# Patient Record
Sex: MALE | Race: WHITE | Employment: UNEMPLOYED | URBAN - NONMETROPOLITAN AREA
[De-identification: names, ages, dates, MRNs, and addresses within clinical notes are randomized per-mention and may not be internally consistent; named-entity substitution may affect disease eponyms.]

---

## 2021-02-19 ENCOUNTER — LAB ENCOUNTER (OUTPATIENT)
Dept: LAB | Age: 51
End: 2021-02-19
Attending: FAMILY MEDICINE
Payer: COMMERCIAL

## 2021-02-19 ENCOUNTER — OFFICE VISIT (OUTPATIENT)
Dept: FAMILY MEDICINE CLINIC | Facility: CLINIC | Age: 51
End: 2021-02-19
Payer: COMMERCIAL

## 2021-02-19 VITALS
TEMPERATURE: 98 F | WEIGHT: 193 LBS | DIASTOLIC BLOOD PRESSURE: 78 MMHG | BODY MASS INDEX: 27.63 KG/M2 | OXYGEN SATURATION: 98 % | HEART RATE: 77 BPM | HEIGHT: 70 IN | RESPIRATION RATE: 14 BRPM | SYSTOLIC BLOOD PRESSURE: 108 MMHG

## 2021-02-19 DIAGNOSIS — Z13.1 SCREENING FOR DIABETES MELLITUS: ICD-10-CM

## 2021-02-19 DIAGNOSIS — Z00.00 LABORATORY EXAM ORDERED AS PART OF ROUTINE GENERAL MEDICAL EXAMINATION: ICD-10-CM

## 2021-02-19 DIAGNOSIS — R35.0 URINARY FREQUENCY: ICD-10-CM

## 2021-02-19 DIAGNOSIS — Z00.00 PREVENTATIVE HEALTH CARE: Primary | ICD-10-CM

## 2021-02-19 DIAGNOSIS — Z13.220 SCREENING, LIPID: ICD-10-CM

## 2021-02-19 DIAGNOSIS — Z12.5 SCREENING FOR PROSTATE CANCER: ICD-10-CM

## 2021-02-19 DIAGNOSIS — Z23 NEED FOR VACCINATION: ICD-10-CM

## 2021-02-19 DIAGNOSIS — K42.9 UMBILICAL HERNIA WITHOUT OBSTRUCTION AND WITHOUT GANGRENE: ICD-10-CM

## 2021-02-19 DIAGNOSIS — Z12.11 SCREEN FOR COLON CANCER: ICD-10-CM

## 2021-02-19 LAB
ALBUMIN SERPL-MCNC: 3.9 G/DL (ref 3.4–5)
ALBUMIN/GLOB SERPL: 1.1 {RATIO} (ref 1–2)
ALP LIVER SERPL-CCNC: 94 U/L
ALT SERPL-CCNC: 53 U/L
ANION GAP SERPL CALC-SCNC: 6 MMOL/L (ref 0–18)
APPEARANCE: CLEAR
AST SERPL-CCNC: 12 U/L (ref 15–37)
BILIRUB SERPL-MCNC: 0.7 MG/DL (ref 0.1–2)
BILIRUBIN: NEGATIVE
BUN BLD-MCNC: 20 MG/DL (ref 7–18)
BUN/CREAT SERPL: 19 (ref 10–20)
CALCIUM BLD-MCNC: 8.5 MG/DL (ref 8.5–10.1)
CHLORIDE SERPL-SCNC: 104 MMOL/L (ref 98–112)
CHOLEST SMN-MCNC: 228 MG/DL (ref ?–200)
CO2 SERPL-SCNC: 25 MMOL/L (ref 21–32)
COMPLEXED PSA SERPL-MCNC: 1 NG/ML (ref ?–4)
CREAT BLD-MCNC: 1.05 MG/DL
EST. AVERAGE GLUCOSE BLD GHB EST-MCNC: 289 MG/DL (ref 68–126)
GLOBULIN PLAS-MCNC: 3.6 G/DL (ref 2.8–4.4)
GLUCOSE (URINE DIPSTICK): 500 MG/DL
GLUCOSE BLD-MCNC: 261 MG/DL (ref 70–99)
HBA1C MFR BLD HPLC: 11.7 % (ref ?–5.7)
HDLC SERPL-MCNC: 40 MG/DL (ref 40–59)
KETONES (URINE DIPSTICK): NEGATIVE MG/DL
LDLC SERPL CALC-MCNC: 158 MG/DL (ref ?–100)
LEUKOCYTES: NEGATIVE
M PROTEIN MFR SERPL ELPH: 7.5 G/DL (ref 6.4–8.2)
MULTISTIX LOT#: 5077 NUMERIC
NITRITE, URINE: NEGATIVE
NONHDLC SERPL-MCNC: 188 MG/DL (ref ?–130)
OCCULT BLOOD: NEGATIVE
OSMOLALITY SERPL CALC.SUM OF ELEC: 292 MOSM/KG (ref 275–295)
PH, URINE: 6 (ref 4.5–8)
POTASSIUM SERPL-SCNC: 4.3 MMOL/L (ref 3.5–5.1)
PROTEIN (URINE DIPSTICK): NEGATIVE MG/DL
SODIUM SERPL-SCNC: 135 MMOL/L (ref 136–145)
SPECIFIC GRAVITY: 1.02 (ref 1–1.03)
TRIGL SERPL-MCNC: 152 MG/DL (ref 30–149)
URINE-COLOR: YELLOW
UROBILINOGEN,SEMI-QN: 0.2 MG/DL (ref 0–1.9)
VLDLC SERPL CALC-MCNC: 30 MG/DL (ref 0–30)

## 2021-02-19 PROCEDURE — 3078F DIAST BP <80 MM HG: CPT | Performed by: FAMILY MEDICINE

## 2021-02-19 PROCEDURE — 3074F SYST BP LT 130 MM HG: CPT | Performed by: FAMILY MEDICINE

## 2021-02-19 PROCEDURE — 90471 IMMUNIZATION ADMIN: CPT | Performed by: FAMILY MEDICINE

## 2021-02-19 PROCEDURE — 84153 ASSAY OF PSA TOTAL: CPT | Performed by: FAMILY MEDICINE

## 2021-02-19 PROCEDURE — 90715 TDAP VACCINE 7 YRS/> IM: CPT | Performed by: FAMILY MEDICINE

## 2021-02-19 PROCEDURE — 81003 URINALYSIS AUTO W/O SCOPE: CPT | Performed by: FAMILY MEDICINE

## 2021-02-19 PROCEDURE — 80053 COMPREHEN METABOLIC PANEL: CPT | Performed by: FAMILY MEDICINE

## 2021-02-19 PROCEDURE — 80061 LIPID PANEL: CPT | Performed by: FAMILY MEDICINE

## 2021-02-19 PROCEDURE — 36415 COLL VENOUS BLD VENIPUNCTURE: CPT | Performed by: FAMILY MEDICINE

## 2021-02-19 PROCEDURE — 99386 PREV VISIT NEW AGE 40-64: CPT | Performed by: FAMILY MEDICINE

## 2021-02-19 PROCEDURE — 3008F BODY MASS INDEX DOCD: CPT | Performed by: FAMILY MEDICINE

## 2021-02-19 PROCEDURE — 83036 HEMOGLOBIN GLYCOSYLATED A1C: CPT | Performed by: FAMILY MEDICINE

## 2021-02-19 NOTE — PATIENT INSTRUCTIONS
I reviewed age-appropriate preventative health screening exams as well as immunizations. Patient declines flu vaccine.   Patient information regarding Shingrix provided, patient will check with his insurance for coverage  Tdap booster given  We will check

## 2021-02-19 NOTE — H&P
Delphine French. is a 45year old male who presents for a complete physical exam.  Patient is here to establish care  HPI:   Pt voices concerns ; establish care    Wt Readings from Last 6 Encounters:  02/19/21 : 193 lb (87.5 kg)    Body mass index is 27. yes hrs, voids normal during the day, lots of water, denies decreased urine stream, incontinence, erectile dysfunction, decreased libido   MUSCULOSKELETAL: denies back pain  NEURO: denies headaches, tremors, dizziness, numbness and weakness  PSYCHE: denies dep Encounter      Lipid Panel [E]      Comp Metabolic Panel (14) [E]      PSA (Screening) [E]      Hemoglobin A1C [E]      Urine Dip, auto without Micro      TdaP (Boostrix/Adacel) Vaccine (> 7 Y)      *Venipuncture    Meds & Refills for this Visit:  Requeste

## 2021-02-22 ENCOUNTER — OFFICE VISIT (OUTPATIENT)
Dept: FAMILY MEDICINE CLINIC | Facility: CLINIC | Age: 51
End: 2021-02-22
Payer: COMMERCIAL

## 2021-02-22 ENCOUNTER — TELEPHONE (OUTPATIENT)
Dept: FAMILY MEDICINE CLINIC | Facility: CLINIC | Age: 51
End: 2021-02-22

## 2021-02-22 VITALS
DIASTOLIC BLOOD PRESSURE: 74 MMHG | TEMPERATURE: 97 F | SYSTOLIC BLOOD PRESSURE: 120 MMHG | WEIGHT: 193 LBS | BODY MASS INDEX: 28 KG/M2 | OXYGEN SATURATION: 97 % | HEART RATE: 88 BPM

## 2021-02-22 DIAGNOSIS — R35.0 URINARY FREQUENCY: ICD-10-CM

## 2021-02-22 DIAGNOSIS — Z51.81 MEDICATION MONITORING ENCOUNTER: ICD-10-CM

## 2021-02-22 DIAGNOSIS — E11.9 NEW ONSET TYPE 2 DIABETES MELLITUS (HCC): Primary | ICD-10-CM

## 2021-02-22 LAB
CREAT UR-SCNC: 64.7 MG/DL
MICROALBUMIN UR-MCNC: 2.36 MG/DL
MICROALBUMIN/CREAT 24H UR-RTO: 36.5 UG/MG (ref ?–30)

## 2021-02-22 PROCEDURE — 99214 OFFICE O/P EST MOD 30 MIN: CPT | Performed by: FAMILY MEDICINE

## 2021-02-22 PROCEDURE — 82043 UR ALBUMIN QUANTITATIVE: CPT | Performed by: FAMILY MEDICINE

## 2021-02-22 PROCEDURE — 3078F DIAST BP <80 MM HG: CPT | Performed by: FAMILY MEDICINE

## 2021-02-22 PROCEDURE — 3074F SYST BP LT 130 MM HG: CPT | Performed by: FAMILY MEDICINE

## 2021-02-22 PROCEDURE — 82570 ASSAY OF URINE CREATININE: CPT | Performed by: FAMILY MEDICINE

## 2021-02-22 PROCEDURE — 3060F POS MICROALBUMINURIA REV: CPT | Performed by: FAMILY MEDICINE

## 2021-02-22 PROCEDURE — 3061F NEG MICROALBUMINURIA REV: CPT | Performed by: FAMILY MEDICINE

## 2021-02-22 RX ORDER — GLUCOSAMINE HCL/CHONDROITIN SU 500-400 MG
CAPSULE ORAL
Qty: 100 STRIP | Refills: 3 | Status: SHIPPED | OUTPATIENT
Start: 2021-02-22

## 2021-02-22 RX ORDER — LANCETS 30 GAUGE
EACH MISCELLANEOUS
Qty: 100 EACH | Refills: 3 | Status: SHIPPED | OUTPATIENT
Start: 2021-02-22

## 2021-02-22 RX ORDER — ATORVASTATIN CALCIUM 40 MG/1
40 TABLET, FILM COATED ORAL NIGHTLY
Qty: 90 TABLET | Refills: 1 | Status: SHIPPED | OUTPATIENT
Start: 2021-02-22 | End: 2021-08-13

## 2021-02-22 RX ORDER — DIPHENHYDRAMINE HYDROCHLORIDE 25 MG/1
CAPSULE, LIQUID FILLED ORAL
Qty: 1 KIT | Refills: 0 | Status: SHIPPED | OUTPATIENT
Start: 2021-02-22

## 2021-02-22 RX ORDER — METFORMIN HYDROCHLORIDE 500 MG/1
TABLET, EXTENDED RELEASE ORAL
Qty: 141 TABLET | Refills: 0 | Status: SHIPPED | OUTPATIENT
Start: 2021-02-22 | End: 2021-03-17

## 2021-02-22 RX ORDER — GLIMEPIRIDE 2 MG/1
2 TABLET ORAL
Qty: 30 TABLET | Refills: 1 | Status: SHIPPED | OUTPATIENT
Start: 2021-02-22 | End: 2021-03-17

## 2021-02-22 NOTE — PATIENT INSTRUCTIONS
A total of 30 minutes was spent with the patient discussing diabetes, monitoring, goals for fasting and postmeal blood sugars as well as hemoglobin A1c, cardiovascular complications etc.  I reviewed the results of recent labs  I reviewed goals for fasting

## 2021-02-22 NOTE — PROGRESS NOTES
Don Oliver. is a 45year old male. Patient presents with:  Diabetes: new onset    HPI:   Patient was seen recently for a complete physical with complaints of urinary frequency and urgency.   His urine showed glucose and a subsequent hemoglobin A1c was pain on exertion, edema  GI: denies abdominal pain and denies heartburn  NEURO: denies headaches  PSYCH: denies feeling stressed or anxious  : Frequency, nocturia, urgency  EXAM:   /74   Pulse 88   Temp 97.2 °F (36.2 °C) (Temporal)   Wt 193 lb (87. PSA is generally not elevated in healthy men or men with non-prostatic carcinoma.         This test may exhibit interference when a sample is collected from a person who is consuming high dose of biotin (a.k.a., vitamin B7, vitamin H, coenzyme R) supplement Final    Desirable  <200 mg/dL  Borderline  200-239 mg/dL  High      >=240 mg/dL       • HDL Cholesterol 02/19/2021 40  40 - 59 mg/dL Final    Interpretive Information:   An HDL cholesterol <40 mg/dL is low and constitutes a coronary heart disease risk fac total of 30 minutes was spent with the patient discussing diabetes, monitoring, goals for fasting and postmeal blood sugars as well as hemoglobin A1c, cardiovascular complications etc.  I reviewed the results of recent labs  I reviewed goals for fasting an

## 2021-02-23 DIAGNOSIS — E11.9 NEW ONSET TYPE 2 DIABETES MELLITUS (HCC): Primary | ICD-10-CM

## 2021-02-23 RX ORDER — LISINOPRIL 2.5 MG/1
2.5 TABLET ORAL DAILY
Qty: 90 TABLET | Refills: 3 | Status: SHIPPED | OUTPATIENT
Start: 2021-02-23

## 2021-02-25 ENCOUNTER — TELEPHONE (OUTPATIENT)
Dept: FAMILY MEDICINE CLINIC | Facility: CLINIC | Age: 51
End: 2021-02-25

## 2021-02-25 NOTE — TELEPHONE ENCOUNTER
SUPPOSED TO HAVE COLONOSCOPY WITH DR Juarez Ashford, QUESTIONS ON PREP STUFF HE IS SUPPOSED TO TAKE, NOT SURE IF OVER THE COUNTER OR NEEDS PRESCRIBED, PT UNABLE TO GET AHHAYDE OF DR COPPOLA [facility] OFFICE

## 2021-02-28 ENCOUNTER — LAB ENCOUNTER (OUTPATIENT)
Dept: LAB | Facility: HOSPITAL | Age: 51
End: 2021-02-28
Attending: INTERNAL MEDICINE
Payer: COMMERCIAL

## 2021-02-28 DIAGNOSIS — Z01.818 PRE-OP TESTING: ICD-10-CM

## 2021-02-28 LAB — SARS-COV-2 RNA RESP QL NAA+PROBE: NOT DETECTED

## 2021-03-03 PROBLEM — K64.8 INTERNAL HEMORRHOIDS: Status: ACTIVE | Noted: 2021-03-03

## 2021-03-03 PROBLEM — Z12.11 SPECIAL SCREENING FOR MALIGNANT NEOPLASM OF COLON: Status: ACTIVE | Noted: 2021-03-03

## 2021-03-17 ENCOUNTER — OFFICE VISIT (OUTPATIENT)
Dept: FAMILY MEDICINE CLINIC | Facility: CLINIC | Age: 51
End: 2021-03-17
Payer: COMMERCIAL

## 2021-03-17 VITALS
HEIGHT: 70 IN | BODY MASS INDEX: 26.92 KG/M2 | HEART RATE: 78 BPM | RESPIRATION RATE: 14 BRPM | OXYGEN SATURATION: 99 % | DIASTOLIC BLOOD PRESSURE: 70 MMHG | WEIGHT: 188 LBS | TEMPERATURE: 98 F | SYSTOLIC BLOOD PRESSURE: 110 MMHG

## 2021-03-17 DIAGNOSIS — E11.29 MICROALBUMINURIA DUE TO TYPE 2 DIABETES MELLITUS (HCC): ICD-10-CM

## 2021-03-17 DIAGNOSIS — Z51.81 MEDICATION MONITORING ENCOUNTER: ICD-10-CM

## 2021-03-17 DIAGNOSIS — E11.9 NEW ONSET TYPE 2 DIABETES MELLITUS (HCC): Primary | ICD-10-CM

## 2021-03-17 DIAGNOSIS — R80.9 MICROALBUMINURIA DUE TO TYPE 2 DIABETES MELLITUS (HCC): ICD-10-CM

## 2021-03-17 DIAGNOSIS — Z23 NEED FOR VACCINATION: ICD-10-CM

## 2021-03-17 PROBLEM — Z12.11 SPECIAL SCREENING FOR MALIGNANT NEOPLASM OF COLON: Status: RESOLVED | Noted: 2021-03-03 | Resolved: 2021-03-17

## 2021-03-17 PROCEDURE — 3008F BODY MASS INDEX DOCD: CPT | Performed by: FAMILY MEDICINE

## 2021-03-17 PROCEDURE — 3078F DIAST BP <80 MM HG: CPT | Performed by: FAMILY MEDICINE

## 2021-03-17 PROCEDURE — 3074F SYST BP LT 130 MM HG: CPT | Performed by: FAMILY MEDICINE

## 2021-03-17 PROCEDURE — 90732 PPSV23 VACC 2 YRS+ SUBQ/IM: CPT | Performed by: FAMILY MEDICINE

## 2021-03-17 PROCEDURE — 99213 OFFICE O/P EST LOW 20 MIN: CPT | Performed by: FAMILY MEDICINE

## 2021-03-17 PROCEDURE — 90471 IMMUNIZATION ADMIN: CPT | Performed by: FAMILY MEDICINE

## 2021-03-17 RX ORDER — GLIMEPIRIDE 2 MG/1
2 TABLET ORAL
Qty: 30 TABLET | Refills: 1 | Status: SHIPPED | OUTPATIENT
Start: 2021-03-17 | End: 2021-04-21

## 2021-03-17 RX ORDER — METFORMIN HYDROCHLORIDE 500 MG/1
1000 TABLET, EXTENDED RELEASE ORAL 2 TIMES DAILY WITH MEALS
Qty: 180 TABLET | Refills: 1 | Status: SHIPPED | OUTPATIENT
Start: 2021-03-17 | End: 2021-06-11

## 2021-03-17 NOTE — PROGRESS NOTES
Cooper Singh. is a 45year old male. Patient presents with:  Diabetes: f/u  Lipids: f/u  Medication Follow-Up    HPI:   Patient was diagnosed with new onset dpiabetes last month.   Metformin and glimepiride were initiated as well as atorvastatin and low Smokeless tobacco: Never Used    Vaping Use      Vaping Use: Never used    Alcohol use: Yes      Comment: a little bit but not much    Drug use: Yes      Types: Cannabis      Comment: several times per week       REVIEW OF SYSTEMS:   GENERAL HEALTH: feels restriction, daily aerobic activity, alcohol moderation, and maintaining ideal body weight. I discussed importance of annual dilated retinal exams and glaucoma screening, routine foot exams and good fitting footwear. Reviewed age-appropriate medications.

## 2021-03-19 ENCOUNTER — TELEPHONE (OUTPATIENT)
Dept: FAMILY MEDICINE CLINIC | Facility: CLINIC | Age: 51
End: 2021-03-19

## 2021-03-19 NOTE — TELEPHONE ENCOUNTER
Pt was supposed to get glimepiride but he received metformin is this right? Pt was seen last Monday.

## 2021-03-19 NOTE — TELEPHONE ENCOUNTER
Spoke with patient and informed him that both his Metformin and his Glimepiride were both sent in. He verbalized understanding.

## 2021-04-12 RX ORDER — GLIMEPIRIDE 2 MG/1
TABLET ORAL
Qty: 30 TABLET | Refills: 1 | OUTPATIENT
Start: 2021-04-12

## 2021-04-19 ENCOUNTER — OFFICE VISIT (OUTPATIENT)
Dept: FAMILY MEDICINE CLINIC | Facility: CLINIC | Age: 51
End: 2021-04-19
Payer: COMMERCIAL

## 2021-04-19 VITALS
HEIGHT: 70 IN | DIASTOLIC BLOOD PRESSURE: 70 MMHG | RESPIRATION RATE: 16 BRPM | OXYGEN SATURATION: 98 % | SYSTOLIC BLOOD PRESSURE: 108 MMHG | WEIGHT: 184 LBS | BODY MASS INDEX: 26.34 KG/M2 | TEMPERATURE: 98 F | HEART RATE: 72 BPM

## 2021-04-19 DIAGNOSIS — E11.9 NEW ONSET TYPE 2 DIABETES MELLITUS (HCC): Primary | ICD-10-CM

## 2021-04-19 DIAGNOSIS — Z51.81 MEDICATION MONITORING ENCOUNTER: ICD-10-CM

## 2021-04-19 PROCEDURE — 3074F SYST BP LT 130 MM HG: CPT | Performed by: FAMILY MEDICINE

## 2021-04-19 PROCEDURE — 3008F BODY MASS INDEX DOCD: CPT | Performed by: FAMILY MEDICINE

## 2021-04-19 PROCEDURE — 3078F DIAST BP <80 MM HG: CPT | Performed by: FAMILY MEDICINE

## 2021-04-19 PROCEDURE — 99213 OFFICE O/P EST LOW 20 MIN: CPT | Performed by: FAMILY MEDICINE

## 2021-04-19 NOTE — PROGRESS NOTES
Pam Mccann. is a 45year old male. Patient presents with:  Diabetes  Medication Follow-Up      HPI:   Diagnosed with diabetes in February. He was started on Metformin, glimepiride, atorvastatin and lisinopril.   His weight is down an additional 4 ese appetite ok  SKIN: denies any unusual skin lesions or rashes  ENT: denies nasal congestion, pnd, sore throat, ear pain or pressure, decreased hearing  RESPIRATORY: denies shortness of breath with exertion,cough, wheezing  CARDIOVASCULAR: denies chest pain

## 2021-04-22 RX ORDER — LANCETS 30 GAUGE
EACH MISCELLANEOUS
Qty: 100 EACH | Refills: 3 | OUTPATIENT
Start: 2021-04-22

## 2021-04-22 RX ORDER — GLUCOSAMINE HCL/CHONDROITIN SU 500-400 MG
CAPSULE ORAL
Qty: 100 STRIP | Refills: 3 | OUTPATIENT
Start: 2021-04-22

## 2021-04-22 RX ORDER — GLIMEPIRIDE 2 MG/1
2 TABLET ORAL
Qty: 90 TABLET | Refills: 0 | Status: SHIPPED | OUTPATIENT
Start: 2021-04-22 | End: 2021-07-16

## 2021-04-22 RX ORDER — GLIMEPIRIDE 2 MG/1
2 TABLET ORAL
Qty: 30 TABLET | Refills: 0 | Status: SHIPPED | OUTPATIENT
Start: 2021-04-22 | End: 2021-04-22

## 2021-04-22 NOTE — TELEPHONE ENCOUNTER
Last refill #30 x 1 on 3/17/2021  Last office visit pertaining to refill on 4/19/21  Future Appointments   Date Time Provider Breana Asif   4/26/2021  3:30 PM Unique Mays RN, CDE [facility]     Last A1c value was 11.7% done 2/19

## 2021-04-26 ENCOUNTER — TELEPHONE (OUTPATIENT)
Dept: ENDOCRINOLOGY CLINIC | Facility: CLINIC | Age: 51
End: 2021-04-26

## 2021-05-29 ENCOUNTER — NURSE ONLY (OUTPATIENT)
Dept: FAMILY MEDICINE CLINIC | Facility: CLINIC | Age: 51
End: 2021-05-29
Payer: COMMERCIAL

## 2021-05-29 DIAGNOSIS — Z51.81 MEDICATION MONITORING ENCOUNTER: Primary | ICD-10-CM

## 2021-05-29 DIAGNOSIS — E11.9 NEW ONSET TYPE 2 DIABETES MELLITUS (HCC): ICD-10-CM

## 2021-05-29 PROCEDURE — 80061 LIPID PANEL: CPT | Performed by: FAMILY MEDICINE

## 2021-05-29 PROCEDURE — 84460 ALANINE AMINO (ALT) (SGPT): CPT | Performed by: FAMILY MEDICINE

## 2021-05-29 PROCEDURE — 84681 ASSAY OF C-PEPTIDE: CPT | Performed by: FAMILY MEDICINE

## 2021-05-29 PROCEDURE — 83036 HEMOGLOBIN GLYCOSYLATED A1C: CPT | Performed by: FAMILY MEDICINE

## 2021-06-11 RX ORDER — METFORMIN HYDROCHLORIDE 500 MG/1
TABLET, EXTENDED RELEASE ORAL
Qty: 360 TABLET | Refills: 0 | Status: SHIPPED | OUTPATIENT
Start: 2021-06-11 | End: 2021-07-20

## 2021-06-11 NOTE — TELEPHONE ENCOUNTER
Last refill: 03/17/21  Qty: 180  W/ 1 refills  Last ov: 04/19/21    Requested Prescriptions     Pending Prescriptions Disp Refills   • METFORMIN HCL  MG Oral Tablet 24 Hr [Pharmacy Med Name: METFORMIN HCL  MG TABLET] 360 tablet 0     Sig: TAKE

## 2021-07-16 RX ORDER — GLIMEPIRIDE 2 MG/1
2 TABLET ORAL
Qty: 90 TABLET | Refills: 0 | Status: SHIPPED | OUTPATIENT
Start: 2021-07-16 | End: 2021-07-20 | Stop reason: DRUGHIGH

## 2021-07-20 ENCOUNTER — OFFICE VISIT (OUTPATIENT)
Dept: FAMILY MEDICINE CLINIC | Facility: CLINIC | Age: 51
End: 2021-07-20
Payer: COMMERCIAL

## 2021-07-20 VITALS
DIASTOLIC BLOOD PRESSURE: 80 MMHG | TEMPERATURE: 98 F | WEIGHT: 180 LBS | RESPIRATION RATE: 16 BRPM | HEART RATE: 71 BPM | OXYGEN SATURATION: 98 % | SYSTOLIC BLOOD PRESSURE: 120 MMHG | HEIGHT: 70 IN | BODY MASS INDEX: 25.77 KG/M2

## 2021-07-20 DIAGNOSIS — Z51.81 MEDICATION MONITORING ENCOUNTER: ICD-10-CM

## 2021-07-20 DIAGNOSIS — E11.9 TYPE 2 DIABETES MELLITUS WITHOUT COMPLICATION, WITHOUT LONG-TERM CURRENT USE OF INSULIN (HCC): Primary | ICD-10-CM

## 2021-07-20 PROCEDURE — 3079F DIAST BP 80-89 MM HG: CPT | Performed by: FAMILY MEDICINE

## 2021-07-20 PROCEDURE — 3008F BODY MASS INDEX DOCD: CPT | Performed by: FAMILY MEDICINE

## 2021-07-20 PROCEDURE — 99213 OFFICE O/P EST LOW 20 MIN: CPT | Performed by: FAMILY MEDICINE

## 2021-07-20 PROCEDURE — 3074F SYST BP LT 130 MM HG: CPT | Performed by: FAMILY MEDICINE

## 2021-07-20 RX ORDER — METFORMIN HYDROCHLORIDE 500 MG/1
TABLET, EXTENDED RELEASE ORAL
Qty: 360 TABLET | Refills: 0 | COMMUNITY
Start: 2021-07-20 | End: 2021-12-13

## 2021-07-20 RX ORDER — GLIMEPIRIDE 1 MG/1
1 TABLET ORAL
Qty: 90 TABLET | Refills: 0 | Status: SHIPPED | OUTPATIENT
Start: 2021-07-20 | End: 2021-10-13

## 2021-07-20 NOTE — PROGRESS NOTES
HPI/Subjective:   Patient ID: Wendy Piña. is a 45year old male.     Patient presents with:  Diabetes  Lipids  Medication Follow-Up    Wt down 4# since last visit, FBS 110s  bs average 123 last 7 days, 14 days, 117 last 30 days  Walking 2-3 times per UNKNOWN    Objective:   Physical Exam  Constitutional:       Appearance: Normal appearance. He is normal weight. HENT:      Mouth/Throat:      Mouth: Mucous membranes are moist.   Eyes:      Extraocular Movements: Extraocular movements intact.    Sergey Weinberg 05/29/2021 114  <200 mg/dL Final    Desirable  <200 mg/dL  Borderline  200-239 mg/dL  High      >=240 mg/dL       • HDL Cholesterol 05/29/2021 38* 40 - 59 mg/dL Final    Interpretive Information:   An HDL cholesterol <40 mg/dL is low and constitutes a danita Visit:  Requested Prescriptions     Signed Prescriptions Disp Refills   • glimepiride 1 MG Oral Tab 90 tablet 0     Sig: Take 1 tablet (1 mg total) by mouth daily with breakfast.       Imaging & Referrals:  None

## 2021-08-13 RX ORDER — ATORVASTATIN CALCIUM 40 MG/1
TABLET, FILM COATED ORAL
Qty: 90 TABLET | Refills: 0 | Status: SHIPPED | OUTPATIENT
Start: 2021-08-13 | End: 2021-12-13

## 2021-08-13 NOTE — TELEPHONE ENCOUNTER
Last refill: 02/22/21  Qty: 90  W/ 1 refills  Last ov: 07/20/21    Requested Prescriptions     Pending Prescriptions Disp Refills   • ATORVASTATIN 40 MG Oral Tab [Pharmacy Med Name: ATORVASTATIN 40 MG TABLET] 90 tablet 1     Sig: TAKE 1 TABLET BY MOUTH KWASI

## 2021-09-03 ENCOUNTER — LAB ENCOUNTER (OUTPATIENT)
Dept: LAB | Age: 51
End: 2021-09-03
Attending: FAMILY MEDICINE
Payer: COMMERCIAL

## 2021-09-03 DIAGNOSIS — E11.9 NEW ONSET TYPE 2 DIABETES MELLITUS (HCC): ICD-10-CM

## 2021-09-03 LAB
EST. AVERAGE GLUCOSE BLD GHB EST-MCNC: 134 MG/DL (ref 68–126)
HBA1C MFR BLD HPLC: 6.3 % (ref ?–5.7)

## 2021-09-03 PROCEDURE — 83036 HEMOGLOBIN GLYCOSYLATED A1C: CPT | Performed by: FAMILY MEDICINE

## 2021-10-13 RX ORDER — GLIMEPIRIDE 1 MG/1
1 TABLET ORAL
Qty: 90 TABLET | Refills: 0 | Status: SHIPPED | OUTPATIENT
Start: 2021-10-13 | End: 2021-12-13

## 2021-10-13 NOTE — TELEPHONE ENCOUNTER
LOV 07/20/2021    LAST LAB 09/03/2021    LAST RX  Glimepiride #90 R0 07/20/2021    Next OV No future appointments.     PROTOCOL   Diabetic Medication Protocol Passed 10/13/2021 12:24 AM   Protocol Details  HgBA1C procedure resulted in past 6 months    Last

## 2021-12-13 RX ORDER — GLIMEPIRIDE 1 MG/1
TABLET ORAL
Qty: 90 TABLET | Refills: 0 | Status: SHIPPED | OUTPATIENT
Start: 2021-12-13

## 2021-12-13 RX ORDER — METFORMIN HYDROCHLORIDE 500 MG/1
TABLET, EXTENDED RELEASE ORAL
Qty: 360 TABLET | Refills: 0 | Status: SHIPPED | OUTPATIENT
Start: 2021-12-13 | End: 2022-01-18

## 2021-12-13 RX ORDER — ATORVASTATIN CALCIUM 40 MG/1
TABLET, FILM COATED ORAL
Qty: 90 TABLET | Refills: 0 | Status: SHIPPED | OUTPATIENT
Start: 2021-12-13

## 2021-12-13 NOTE — TELEPHONE ENCOUNTER
Glimepiride   Last refill: 10/13/21  Qty: 90  W/ 0 refills  Last ov: 07/20/21    Metformin  Last refill: 07/20/21  Qty: 360  W/ 0 refills  Last ov: 07/20/21  Last lab 05/29/21     Atorvastatin   Last refill: 08/13/21  Qty: 90  W/ 0 refills   Last ov: 07/20

## 2022-01-10 ENCOUNTER — TELEPHONE (OUTPATIENT)
Dept: FAMILY MEDICINE CLINIC | Facility: CLINIC | Age: 52
End: 2022-01-10

## 2022-01-10 NOTE — TELEPHONE ENCOUNTER
Advised pt he is due for an OV and fasting labs. .. Appt made.     Future Appointments   Date Time Provider Breana Asif   1/18/2022  8:00 AM Renetta Ledesma.,  [facility]

## 2022-01-18 ENCOUNTER — OFFICE VISIT (OUTPATIENT)
Dept: FAMILY MEDICINE CLINIC | Facility: CLINIC | Age: 52
End: 2022-01-18
Payer: COMMERCIAL

## 2022-01-18 VITALS
HEART RATE: 85 BPM | BODY MASS INDEX: 26.05 KG/M2 | OXYGEN SATURATION: 98 % | WEIGHT: 182 LBS | DIASTOLIC BLOOD PRESSURE: 70 MMHG | HEIGHT: 70 IN | SYSTOLIC BLOOD PRESSURE: 110 MMHG | RESPIRATION RATE: 16 BRPM | TEMPERATURE: 97 F

## 2022-01-18 DIAGNOSIS — Z51.81 MEDICATION MONITORING ENCOUNTER: ICD-10-CM

## 2022-01-18 DIAGNOSIS — Z79.899 ENCOUNTER FOR LONG-TERM (CURRENT) DRUG USE: ICD-10-CM

## 2022-01-18 DIAGNOSIS — M54.42 CHRONIC LEFT-SIDED LOW BACK PAIN WITH LEFT-SIDED SCIATICA: ICD-10-CM

## 2022-01-18 DIAGNOSIS — G89.29 CHRONIC LEFT-SIDED LOW BACK PAIN WITH LEFT-SIDED SCIATICA: ICD-10-CM

## 2022-01-18 DIAGNOSIS — E78.5 ELEVATED LIPIDS: ICD-10-CM

## 2022-01-18 DIAGNOSIS — Z23 NEED FOR VACCINATION: ICD-10-CM

## 2022-01-18 DIAGNOSIS — E11.9 TYPE 2 DIABETES MELLITUS WITHOUT COMPLICATION, WITHOUT LONG-TERM CURRENT USE OF INSULIN (HCC): Primary | ICD-10-CM

## 2022-01-18 LAB
ALBUMIN SERPL-MCNC: 4.4 G/DL (ref 3.4–5)
ALBUMIN/GLOB SERPL: 1.3 {RATIO} (ref 1–2)
ALP LIVER SERPL-CCNC: 69 U/L
ALT SERPL-CCNC: 48 U/L
ANION GAP SERPL CALC-SCNC: 4 MMOL/L (ref 0–18)
AST SERPL-CCNC: 20 U/L (ref 15–37)
BILIRUB SERPL-MCNC: 0.8 MG/DL (ref 0.1–2)
BUN BLD-MCNC: 20 MG/DL (ref 7–18)
CALCIUM BLD-MCNC: 9.5 MG/DL (ref 8.5–10.1)
CHLORIDE SERPL-SCNC: 104 MMOL/L (ref 98–112)
CHOLEST SERPL-MCNC: 143 MG/DL (ref ?–200)
CO2 SERPL-SCNC: 30 MMOL/L (ref 21–32)
CREAT BLD-MCNC: 0.9 MG/DL
EST. AVERAGE GLUCOSE BLD GHB EST-MCNC: 151 MG/DL (ref 68–126)
FASTING PATIENT LIPID ANSWER: YES
FASTING STATUS PATIENT QL REPORTED: YES
GLOBULIN PLAS-MCNC: 3.3 G/DL (ref 2.8–4.4)
GLUCOSE BLD-MCNC: 164 MG/DL (ref 70–99)
HBA1C MFR BLD: 6.9 % (ref ?–5.7)
HDLC SERPL-MCNC: 39 MG/DL (ref 40–59)
LDLC SERPL CALC-MCNC: 78 MG/DL (ref ?–100)
NONHDLC SERPL-MCNC: 104 MG/DL (ref ?–130)
OSMOLALITY SERPL CALC.SUM OF ELEC: 292 MOSM/KG (ref 275–295)
POTASSIUM SERPL-SCNC: 5 MMOL/L (ref 3.5–5.1)
PROT SERPL-MCNC: 7.7 G/DL (ref 6.4–8.2)
SODIUM SERPL-SCNC: 138 MMOL/L (ref 136–145)
TRIGL SERPL-MCNC: 147 MG/DL (ref 30–149)
VLDLC SERPL CALC-MCNC: 23 MG/DL (ref 0–30)

## 2022-01-18 PROCEDURE — 83036 HEMOGLOBIN GLYCOSYLATED A1C: CPT | Performed by: FAMILY MEDICINE

## 2022-01-18 PROCEDURE — 3008F BODY MASS INDEX DOCD: CPT | Performed by: FAMILY MEDICINE

## 2022-01-18 PROCEDURE — 99214 OFFICE O/P EST MOD 30 MIN: CPT | Performed by: FAMILY MEDICINE

## 2022-01-18 PROCEDURE — 90471 IMMUNIZATION ADMIN: CPT | Performed by: FAMILY MEDICINE

## 2022-01-18 PROCEDURE — 3078F DIAST BP <80 MM HG: CPT | Performed by: FAMILY MEDICINE

## 2022-01-18 PROCEDURE — 80061 LIPID PANEL: CPT | Performed by: FAMILY MEDICINE

## 2022-01-18 PROCEDURE — 3074F SYST BP LT 130 MM HG: CPT | Performed by: FAMILY MEDICINE

## 2022-01-18 PROCEDURE — 90750 HZV VACC RECOMBINANT IM: CPT | Performed by: FAMILY MEDICINE

## 2022-01-18 PROCEDURE — 3044F HG A1C LEVEL LT 7.0%: CPT | Performed by: FAMILY MEDICINE

## 2022-01-18 PROCEDURE — 80053 COMPREHEN METABOLIC PANEL: CPT | Performed by: FAMILY MEDICINE

## 2022-01-18 RX ORDER — METFORMIN HYDROCHLORIDE 500 MG/1
TABLET, EXTENDED RELEASE ORAL
Qty: 360 TABLET | Refills: 0 | COMMUNITY
Start: 2022-01-18

## 2022-01-18 NOTE — PATIENT INSTRUCTIONS
1. Type 2 diabetes mellitus without complication, without long-term current use of insulin ([facility] [address]  I reviewed goals for fasting and postmeal blood sugars.   Discussed importance of annual dilated retinal exams and glaucoma screening  Discussed routine foot e

## 2022-01-18 NOTE — PROGRESS NOTES
Cari Patel. is a 45year old male. Patient presents with:  Diabetes  Lipids  Medication Follow-Up      HPI:   Wt unchanged since last visit, not eating as healthy  Pt infrequently checking bs, , post meal BS -not checking, no hypoglycemic epis with exertion,cough, wheezing  CARDIOVASCULAR: denies chest pain on exertion, edema  GI: denies abdominal pain and denies heartburn  NEURO: denies headaches  PSYCH: denies feeling stressed or anxious  MSK: c/o :BP, tightness down left hamstring  EXAM:   BP without long-term current use of insulin ([facility]  I reviewed goals for fasting and postmeal blood sugars.   Discussed importance of annual dilated retinal exams and glaucoma screening  Discussed routine foot exams and good fitting footwear  - HEMOGLOBIN A1C

## 2022-01-19 DIAGNOSIS — E78.5 ELEVATED LIPIDS: ICD-10-CM

## 2022-01-19 DIAGNOSIS — Z51.81 MEDICATION MONITORING ENCOUNTER: ICD-10-CM

## 2022-01-19 DIAGNOSIS — E11.9 TYPE 2 DIABETES MELLITUS WITHOUT COMPLICATION, WITHOUT LONG-TERM CURRENT USE OF INSULIN (HCC): Primary | ICD-10-CM

## 2022-02-08 ENCOUNTER — TELEPHONE (OUTPATIENT)
Dept: FAMILY MEDICINE CLINIC | Facility: CLINIC | Age: 52
End: 2022-02-08

## 2022-03-14 RX ORDER — LISINOPRIL 2.5 MG/1
2.5 TABLET ORAL DAILY
Qty: 90 TABLET | Refills: 3 | Status: SHIPPED | OUTPATIENT
Start: 2022-03-14

## 2022-03-14 NOTE — TELEPHONE ENCOUNTER
Last OV: 1/18/22  Last labs: 1/18/22    No future appointments.      Hypertension Medications Protocol Passed 03/13/2022 07:33 AM   Protocol Details  CMP or BMP in past 12 months    Last serum creatinine< 2.0    Appointment in past 6 or next 3 months

## 2022-03-23 RX ORDER — ATORVASTATIN CALCIUM 40 MG/1
40 TABLET, FILM COATED ORAL NIGHTLY
Qty: 90 TABLET | Refills: 0 | Status: SHIPPED | OUTPATIENT
Start: 2022-03-23

## 2022-03-23 NOTE — TELEPHONE ENCOUNTER
Last refill: 12/13/21 #90 w/ 0 refills    Last OV: 1/18/22  Last labs: 1/18/22    No future appointments.         Cholesterol Medication Protocol Passed 03/23/2022 12:31 PM   Protocol Details  ALT < 80    ALT resulted within past year    Lipid panel within past 12 months    Appointment within past 12 or next 3 months

## 2022-05-16 RX ORDER — METFORMIN HYDROCHLORIDE 500 MG/1
TABLET, EXTENDED RELEASE ORAL
Qty: 360 TABLET | Refills: 0 | Status: SHIPPED | OUTPATIENT
Start: 2022-05-16

## 2022-05-16 RX ORDER — GLIMEPIRIDE 1 MG/1
1 TABLET ORAL
Qty: 90 TABLET | Refills: 0 | Status: SHIPPED | OUTPATIENT
Start: 2022-05-16

## 2022-05-16 NOTE — TELEPHONE ENCOUNTER
LOV 01/18/2022     LAST LAB 01/18/2022    LAST RX  Metformin #360 R0 01/18/2022  Glimepiride #90 R0 12/13/2021  Next OV No future appointments.     PROTOCOL   Diabetic Medication Protocol Passed 05/16/2022 03:03 PM   Protocol Details  HgBA1C procedure resulted in past 6 months    Last HgBA1C < 7.5    Microalbumin procedure in past 12 months or taking ACE/ARB    Appointment in past 6 or next 3 months

## 2022-06-20 RX ORDER — ATORVASTATIN CALCIUM 40 MG/1
TABLET, FILM COATED ORAL
Qty: 90 TABLET | Refills: 0 | Status: SHIPPED | OUTPATIENT
Start: 2022-06-20

## 2022-06-24 ENCOUNTER — OFFICE VISIT (OUTPATIENT)
Dept: FAMILY MEDICINE CLINIC | Facility: CLINIC | Age: 52
End: 2022-06-24
Payer: COMMERCIAL

## 2022-06-24 ENCOUNTER — LABORATORY ENCOUNTER (OUTPATIENT)
Dept: LAB | Age: 52
End: 2022-06-24
Attending: FAMILY MEDICINE
Payer: COMMERCIAL

## 2022-06-24 VITALS
SYSTOLIC BLOOD PRESSURE: 122 MMHG | RESPIRATION RATE: 16 BRPM | OXYGEN SATURATION: 98 % | DIASTOLIC BLOOD PRESSURE: 80 MMHG | HEIGHT: 70 IN | HEART RATE: 65 BPM | BODY MASS INDEX: 26.2 KG/M2 | WEIGHT: 183 LBS | TEMPERATURE: 97 F

## 2022-06-24 DIAGNOSIS — E78.5 ELEVATED LIPIDS: ICD-10-CM

## 2022-06-24 DIAGNOSIS — E11.9 TYPE 2 DIABETES MELLITUS WITHOUT COMPLICATION, WITHOUT LONG-TERM CURRENT USE OF INSULIN (HCC): ICD-10-CM

## 2022-06-24 DIAGNOSIS — Z00.00 PREVENTATIVE HEALTH CARE: Primary | ICD-10-CM

## 2022-06-24 DIAGNOSIS — Z12.5 SCREENING FOR PROSTATE CANCER: ICD-10-CM

## 2022-06-24 DIAGNOSIS — R80.9 MICROALBUMINURIA DUE TO TYPE 2 DIABETES MELLITUS (HCC): ICD-10-CM

## 2022-06-24 DIAGNOSIS — F51.02 ADJUSTMENT INSOMNIA: ICD-10-CM

## 2022-06-24 DIAGNOSIS — Z51.81 MEDICATION MONITORING ENCOUNTER: ICD-10-CM

## 2022-06-24 DIAGNOSIS — Z23 NEED FOR VACCINATION: ICD-10-CM

## 2022-06-24 DIAGNOSIS — E11.29 MICROALBUMINURIA DUE TO TYPE 2 DIABETES MELLITUS (HCC): ICD-10-CM

## 2022-06-24 LAB
ANION GAP SERPL CALC-SCNC: 8 MMOL/L (ref 0–18)
BUN BLD-MCNC: 27 MG/DL (ref 7–18)
CALCIUM BLD-MCNC: 9.4 MG/DL (ref 8.5–10.1)
CHLORIDE SERPL-SCNC: 102 MMOL/L (ref 98–112)
CHOLEST SERPL-MCNC: 121 MG/DL (ref ?–200)
CO2 SERPL-SCNC: 26 MMOL/L (ref 21–32)
COMPLEXED PSA SERPL-MCNC: 0.63 NG/ML (ref ?–4)
CREAT BLD-MCNC: 1.08 MG/DL
CREAT UR-SCNC: 204 MG/DL
EST. AVERAGE GLUCOSE BLD GHB EST-MCNC: 157 MG/DL (ref 68–126)
FASTING PATIENT LIPID ANSWER: YES
FASTING STATUS PATIENT QL REPORTED: YES
GLUCOSE BLD-MCNC: 137 MG/DL (ref 70–99)
HBA1C MFR BLD: 7.1 % (ref ?–5.7)
HDLC SERPL-MCNC: 39 MG/DL (ref 40–59)
LDLC SERPL CALC-MCNC: 63 MG/DL (ref ?–100)
MICROALBUMIN UR-MCNC: 3.42 MG/DL
MICROALBUMIN/CREAT 24H UR-RTO: 16.8 UG/MG (ref ?–30)
NONHDLC SERPL-MCNC: 82 MG/DL (ref ?–130)
OSMOLALITY SERPL CALC.SUM OF ELEC: 289 MOSM/KG (ref 275–295)
POTASSIUM SERPL-SCNC: 3.8 MMOL/L (ref 3.5–5.1)
SODIUM SERPL-SCNC: 136 MMOL/L (ref 136–145)
TRIGL SERPL-MCNC: 103 MG/DL (ref 30–149)
VLDLC SERPL CALC-MCNC: 15 MG/DL (ref 0–30)

## 2022-06-24 PROCEDURE — 84153 ASSAY OF PSA TOTAL: CPT | Performed by: FAMILY MEDICINE

## 2022-06-24 PROCEDURE — 82043 UR ALBUMIN QUANTITATIVE: CPT | Performed by: FAMILY MEDICINE

## 2022-06-24 PROCEDURE — 3008F BODY MASS INDEX DOCD: CPT | Performed by: FAMILY MEDICINE

## 2022-06-24 PROCEDURE — 3074F SYST BP LT 130 MM HG: CPT | Performed by: FAMILY MEDICINE

## 2022-06-24 PROCEDURE — 90471 IMMUNIZATION ADMIN: CPT | Performed by: FAMILY MEDICINE

## 2022-06-24 PROCEDURE — 3079F DIAST BP 80-89 MM HG: CPT | Performed by: FAMILY MEDICINE

## 2022-06-24 PROCEDURE — 99396 PREV VISIT EST AGE 40-64: CPT | Performed by: FAMILY MEDICINE

## 2022-06-24 PROCEDURE — 82570 ASSAY OF URINE CREATININE: CPT | Performed by: FAMILY MEDICINE

## 2022-06-24 PROCEDURE — 90750 HZV VACC RECOMBINANT IM: CPT | Performed by: FAMILY MEDICINE

## 2022-06-24 NOTE — PATIENT INSTRUCTIONS
1. Preventative health care  I reviewed age-appropriate preventative health screening exams    2. Type 2 diabetes mellitus without complication, without long-term current use of insulin ([facility]  I reviewed goals for fasting and postmeal blood sugars as well as hemoglobin A1c. Discussed importance of annual dilated retinal exam and glaucoma screening as well as routine foot exams and good fitting footwear  - BASIC METABOLIC PANEL (8)  - HEMOGLOBIN A1C  - MICROALB/CREAT RATIO, RANDOM URINE; Future    3. Microalbuminuria due to type 2 diabetes mellitus ([facility]  Monitor annually    4. Medication monitoring encounter  Check labs  - BASIC METABOLIC PANEL (8)    5. Elevated lipids  Reviewed goals for lipids, continue statin therapy  - LIPID PANEL  - BASIC METABOLIC PANEL (8)    6. Screening for prostate cancer  Monitor annually  - PSA SCREEN; Future    7. Need for vaccination  Reviewed age-appropriate mutations. Shingrix No. 2 given  - ZOSTER VACC RECOMBINANT IM NJX    8. Adjustment insomnia  Discussed conservative management for shift related insomnia. Recommend trial of melatonin 3 to 5 mg 1 hour before bedtime    Ly Verduzco.  Rogelio Gerardo, FAAFP

## 2022-06-27 DIAGNOSIS — Z79.899 ENCOUNTER FOR LONG-TERM (CURRENT) DRUG USE: ICD-10-CM

## 2022-06-27 DIAGNOSIS — E11.9 TYPE 2 DIABETES MELLITUS WITHOUT COMPLICATION, WITHOUT LONG-TERM CURRENT USE OF INSULIN (HCC): Primary | ICD-10-CM

## 2022-06-27 DIAGNOSIS — E78.5 ELEVATED LIPIDS: ICD-10-CM

## 2022-08-12 RX ORDER — METFORMIN HYDROCHLORIDE 500 MG/1
TABLET, EXTENDED RELEASE ORAL
Qty: 360 TABLET | Refills: 0 | Status: SHIPPED | OUTPATIENT
Start: 2022-08-12

## 2022-08-12 RX ORDER — GLIMEPIRIDE 1 MG/1
TABLET ORAL
Qty: 90 TABLET | Refills: 0 | Status: SHIPPED | OUTPATIENT
Start: 2022-08-12

## 2022-08-12 NOTE — TELEPHONE ENCOUNTER
Last refill: 5/16/22 #360 w/ 0 refills  Glimepiride: 5/16/22 #90 w/ 0 refills    Last OV: 6/24/22  Last labs: 6/24/22    No future appointments.          Diabetic Medication Protocol Passed 08/12/2022 02:45 AM   Protocol Details  HgBA1C procedure resulted in past 6 months    Last HgBA1C < 7.5    Microalbumin procedure in past 12 months or taking ACE/ARB    Appointment in past 6 or next 3 months

## 2022-09-14 RX ORDER — GLIMEPIRIDE 1 MG/1
1 TABLET ORAL
Qty: 90 TABLET | Refills: 0 | OUTPATIENT
Start: 2022-09-14

## 2022-09-14 RX ORDER — ATORVASTATIN CALCIUM 40 MG/1
TABLET, FILM COATED ORAL
Qty: 90 TABLET | Refills: 0 | Status: SHIPPED | OUTPATIENT
Start: 2022-09-14

## 2022-09-14 RX ORDER — METFORMIN HYDROCHLORIDE 500 MG/1
TABLET, EXTENDED RELEASE ORAL
Qty: 360 TABLET | Refills: 0 | OUTPATIENT
Start: 2022-09-14

## 2022-10-28 RX ORDER — LISINOPRIL 2.5 MG/1
TABLET ORAL
Qty: 90 TABLET | Refills: 3 | Status: SHIPPED | OUTPATIENT
Start: 2022-10-28

## 2022-11-21 RX ORDER — LISINOPRIL 2.5 MG/1
2.5 TABLET ORAL DAILY
Qty: 90 TABLET | Refills: 3 | Status: CANCELLED | OUTPATIENT
Start: 2022-11-21

## 2022-11-22 NOTE — TELEPHONE ENCOUNTER
Medication was refilled 10/28/22 #90/3 refills. Sent mcm to patient advising him.    Ricardo WEAVER MA, 11/22/22, 4:21 AM

## 2022-12-14 RX ORDER — LISINOPRIL 2.5 MG/1
2.5 TABLET ORAL DAILY
Qty: 90 TABLET | Refills: 3 | OUTPATIENT
Start: 2022-12-14

## 2022-12-14 RX ORDER — GLIMEPIRIDE 1 MG/1
TABLET ORAL
Qty: 90 TABLET | Refills: 0 | Status: SHIPPED | OUTPATIENT
Start: 2022-12-14

## 2022-12-16 RX ORDER — METFORMIN HYDROCHLORIDE 500 MG/1
TABLET, EXTENDED RELEASE ORAL
Qty: 270 TABLET | Refills: 0 | Status: SHIPPED | OUTPATIENT
Start: 2022-12-16

## 2023-02-03 ENCOUNTER — LABORATORY ENCOUNTER (OUTPATIENT)
Dept: LAB | Age: 53
End: 2023-02-03
Attending: FAMILY MEDICINE
Payer: COMMERCIAL

## 2023-02-03 DIAGNOSIS — E11.9 TYPE 2 DIABETES MELLITUS WITHOUT COMPLICATION, WITHOUT LONG-TERM CURRENT USE OF INSULIN (HCC): ICD-10-CM

## 2023-02-03 DIAGNOSIS — E78.5 ELEVATED LIPIDS: ICD-10-CM

## 2023-02-03 DIAGNOSIS — Z79.899 ENCOUNTER FOR LONG-TERM (CURRENT) DRUG USE: ICD-10-CM

## 2023-02-03 LAB
ALBUMIN SERPL-MCNC: 4.2 G/DL (ref 3.4–5)
ALBUMIN/GLOB SERPL: 1.3 {RATIO} (ref 1–2)
ALP LIVER SERPL-CCNC: 59 U/L
ALT SERPL-CCNC: 51 U/L
ANION GAP SERPL CALC-SCNC: 3 MMOL/L (ref 0–18)
AST SERPL-CCNC: 21 U/L (ref 15–37)
BILIRUB SERPL-MCNC: 0.6 MG/DL (ref 0.1–2)
BUN BLD-MCNC: 24 MG/DL (ref 7–18)
CALCIUM BLD-MCNC: 9.3 MG/DL (ref 8.5–10.1)
CHLORIDE SERPL-SCNC: 106 MMOL/L (ref 98–112)
CHOLEST SERPL-MCNC: 124 MG/DL (ref ?–200)
CO2 SERPL-SCNC: 28 MMOL/L (ref 21–32)
CREAT BLD-MCNC: 0.98 MG/DL
EST. AVERAGE GLUCOSE BLD GHB EST-MCNC: 180 MG/DL (ref 68–126)
FASTING PATIENT LIPID ANSWER: YES
FASTING STATUS PATIENT QL REPORTED: YES
GFR SERPLBLD BASED ON 1.73 SQ M-ARVRAT: 92 ML/MIN/1.73M2 (ref 60–?)
GLOBULIN PLAS-MCNC: 3.2 G/DL (ref 2.8–4.4)
GLUCOSE BLD-MCNC: 173 MG/DL (ref 70–99)
HBA1C MFR BLD: 7.9 % (ref ?–5.7)
HDLC SERPL-MCNC: 34 MG/DL (ref 40–59)
LDLC SERPL CALC-MCNC: 69 MG/DL (ref ?–100)
NONHDLC SERPL-MCNC: 90 MG/DL (ref ?–130)
OSMOLALITY SERPL CALC.SUM OF ELEC: 292 MOSM/KG (ref 275–295)
POTASSIUM SERPL-SCNC: 4.3 MMOL/L (ref 3.5–5.1)
PROT SERPL-MCNC: 7.4 G/DL (ref 6.4–8.2)
SODIUM SERPL-SCNC: 137 MMOL/L (ref 136–145)
TRIGL SERPL-MCNC: 116 MG/DL (ref 30–149)
VLDLC SERPL CALC-MCNC: 18 MG/DL (ref 0–30)

## 2023-02-03 PROCEDURE — 36415 COLL VENOUS BLD VENIPUNCTURE: CPT

## 2023-02-03 PROCEDURE — 83036 HEMOGLOBIN GLYCOSYLATED A1C: CPT

## 2023-02-03 PROCEDURE — 80053 COMPREHEN METABOLIC PANEL: CPT

## 2023-02-03 PROCEDURE — 80061 LIPID PANEL: CPT

## 2023-03-15 RX ORDER — GLIMEPIRIDE 1 MG/1
TABLET ORAL
Qty: 90 TABLET | Refills: 0 | Status: SHIPPED | OUTPATIENT
Start: 2023-03-15

## 2023-03-15 RX ORDER — METFORMIN HYDROCHLORIDE 500 MG/1
TABLET, EXTENDED RELEASE ORAL
Qty: 270 TABLET | Refills: 0 | Status: SHIPPED | OUTPATIENT
Start: 2023-03-15

## 2023-03-15 NOTE — TELEPHONE ENCOUNTER
Diabetic Medication Protocol Failed 03/15/2023 12:39 AM   Protocol Details  Last HgBA1C < 7.5    Appointment in past 6 or next 3 months    HgBA1C procedure resulted in past 6 months    Microalbumin procedure in past 12 months or taking ACE/ARB        Last office visit:  06/24/22  Last a1c:  02/03/23  Last micro:  06/24/22  Glimepiride:  12/14/22  #90, no refills  Metformin:  12/16/22  #270, no refills    No future appointments.   Sending mychart-needs office visit

## 2023-04-25 RX ORDER — ATORVASTATIN CALCIUM 40 MG/1
TABLET, FILM COATED ORAL
Qty: 90 TABLET | Refills: 0 | Status: SHIPPED | OUTPATIENT
Start: 2023-04-25

## 2023-04-25 RX ORDER — GLIMEPIRIDE 1 MG/1
TABLET ORAL
Qty: 90 TABLET | Refills: 0 | Status: SHIPPED | OUTPATIENT
Start: 2023-04-25

## 2023-04-25 NOTE — TELEPHONE ENCOUNTER
Called and spoke with Teresa Palacio to let him know that he was due for an office visit in May, he said that he would check his schedule and call back to set this up.

## 2023-04-25 NOTE — TELEPHONE ENCOUNTER
Glimepiride 1 MG oral tab     Diabetic Medication Protocol Failed 04/25/2023 02:36 PM   Protocol Details  Last HgBA1C < 7.5    Appointment in past 6 or next 3 months    HgBA1C procedure resulted in past 6 months    Microalbumin procedure in past 12 months or taking ACE/ARB        Last office visit:  6/24/22  No future appointments.   Last filled:  3/15/23  #90 with 0 refills   Last labs: 2/3/23  HGBA1C: 7.9

## 2023-05-10 RX ORDER — GLIMEPIRIDE 1 MG/1
1 TABLET ORAL
Qty: 90 TABLET | Refills: 0 | Status: CANCELLED | OUTPATIENT
Start: 2023-05-10

## 2023-05-10 RX ORDER — METFORMIN HYDROCHLORIDE 500 MG/1
1000 TABLET, EXTENDED RELEASE ORAL 2 TIMES DAILY
Refills: 0 | COMMUNITY
Start: 2023-05-10

## 2023-05-10 RX ORDER — LISINOPRIL 2.5 MG/1
2.5 TABLET ORAL DAILY
Qty: 90 TABLET | Refills: 3 | Status: CANCELLED | OUTPATIENT
Start: 2023-05-10

## 2023-05-10 NOTE — TELEPHONE ENCOUNTER
Viewed this encounter. Dates don't add up. Viewed [place] -- it shows pt picked up Glimepiride on 3/15/23 from 3/15/23 script and Lisinopril on 3/15/23 from 10/28/22 script. Pt should have refills on file at pharmacy. PC to pharmacy. Spoke with pharmacist. She states pt picked up both scripts on 3/18/23 #90. Pt should have enough meds until beginning of June. PC to pt. Spoke with pt and advised refills aren't due until June. Pt states he is completely out. Advised refills are on hold because insurance won't pay for it early. Advised pt to use GoodRx and pay OOP for meds. Pt is OK with this. Advised to let pharmacy know he will be using GoodRx. This MA reviewed pt's last labs (2/3/23) asked pt if he ever increased his Metformin to 1000 mg BID? Pt states yes. (Med list updated). This MA noticed the medication recommended by Dr. Padmini Mar (sitagliptin) was not sent. Advised pt to keep appt for Saturday and to make sure he tells Dr. Padmini Mar med was never sent. PC to pharmacy. Spoke with [place]. Advised to please get refills ready as pt will be paying OOP for meds. GoodRx codes give. Pharm tech states prescriptions will be ready after 5 PM today. PC to pt. Spoke with pt and advised refills will be ready today at 5 PM. Pt was thankful. No need to send scripts at this time. Refills denied. No further workup needed. Closing note.

## 2023-05-10 NOTE — TELEPHONE ENCOUNTER
Last office visit:  6/24/22  No future appointments. Glimepiride 1 MG oral tab   Diabetic Medication Protocol Failed 05/10/2023 08:50 AM   Protocol Details  Last HgBA1C < 7.5    Appointment in past 6 or next 3 months    HgBA1C procedure resulted in past 6 months    Microalbumin procedure in past 12 months or taking ACE/ARB      Last filled:  4/25/23  #90 with 0 refills   Last labs: 2/3/23  HGBA1C: 7.9    Lisinopril 2.5 MG oral tab  Hypertension Medications Protocol Failed 05/10/2023 08:50 AM   Protocol Details  Appointment in past 6 or next 3 months    CMP or BMP in past 12 months    Last serum creatinine< 2.0   Last filled:  10/28/22  #90 with 3 refills   Last labs:  2/3/23  Crea: 0.98    Called and LM on VM for Ham to call office due for an OV.

## 2023-05-13 ENCOUNTER — OFFICE VISIT (OUTPATIENT)
Dept: FAMILY MEDICINE CLINIC | Facility: CLINIC | Age: 53
End: 2023-05-13
Payer: COMMERCIAL

## 2023-05-13 VITALS
DIASTOLIC BLOOD PRESSURE: 69 MMHG | HEIGHT: 70 IN | RESPIRATION RATE: 18 BRPM | BODY MASS INDEX: 26.58 KG/M2 | OXYGEN SATURATION: 98 % | WEIGHT: 185.69 LBS | SYSTOLIC BLOOD PRESSURE: 110 MMHG | HEART RATE: 72 BPM | TEMPERATURE: 98 F

## 2023-05-13 DIAGNOSIS — Z51.81 MEDICATION MONITORING ENCOUNTER: ICD-10-CM

## 2023-05-13 DIAGNOSIS — E11.9 TYPE 2 DIABETES MELLITUS WITHOUT COMPLICATION, WITHOUT LONG-TERM CURRENT USE OF INSULIN (HCC): Primary | ICD-10-CM

## 2023-05-13 DIAGNOSIS — E78.00 HYPERCHOLESTEROLEMIA: ICD-10-CM

## 2023-05-13 LAB
CARTRIDGE LOT#: 36 NUMERIC
HEMOGLOBIN A1C: 6.8 % (ref 4.3–5.6)

## 2023-05-13 NOTE — PATIENT INSTRUCTIONS
1. Type 2 diabetes mellitus without complication, without long-term current use of insulin ([address]  2 goals for fasting and postmeal blood sugars. Discussed importance of routine foot exams and good fitting footwear as well as annual dilated retinal exams and glaucoma screening. Patient encouraged to schedule follow-up eye exam  Patient encouraged to recommit to daily aerobic activity and healthy food choices. Follow-up for repeat A1c in 3 to 4 months  - HEMOGLOBIN A1C    2. Hypercholesterolemia  Reviewed goals for lipids. Continue statin therapy, recheck labs annually check labs    3.  Medication monitoring encounter  Check labs

## 2023-07-21 RX ORDER — METFORMIN HYDROCHLORIDE 500 MG/1
TABLET, EXTENDED RELEASE ORAL
Qty: 270 TABLET | Refills: 0 | Status: SHIPPED | OUTPATIENT
Start: 2023-07-21

## 2023-07-21 RX ORDER — ATORVASTATIN CALCIUM 40 MG/1
TABLET, FILM COATED ORAL
Qty: 90 TABLET | Refills: 0 | Status: SHIPPED | OUTPATIENT
Start: 2023-07-21

## 2023-07-21 NOTE — TELEPHONE ENCOUNTER
Last office visit:  5/13/23    No future appointments.      METFORMIN  MG Oral Tablet 24 Hr   Diabetic Medication Protocol [id number]/21/2023 12:46 AM   Protocol Details HgBA1C procedure resulted in past 6 months    Last HgBA1C < 7.5    Microalbumin procedure in past 12 months or taking ACE/ARB    Appointment in past 6 or next 3 months   Last filled:   3/15/23  #270 with 0 refills   Last labs:  5/13/23  HGBA1C:  6.8    ATORVASTATIN 40 MG Oral Tab     Cholesterol Medication Protocol [id number]/21/2023 12:46 AM   Protocol Details ALT < 80    ALT resulted within past year    Lipid panel within past 12 months    Appointment within past 12 or next 3 months      Last filled:  4/25/23  #90 with 0 refills   Last labs:  2/3/23  ALT: 51

## 2023-08-05 RX ORDER — GLIMEPIRIDE 1 MG/1
TABLET ORAL
Qty: 90 TABLET | Refills: 0 | Status: SHIPPED | OUTPATIENT
Start: 2023-08-05

## 2023-08-05 NOTE — TELEPHONE ENCOUNTER
GLIMEPIRIDE 1 MG Oral Tab     Diabetic Medication Protocol Cfpusb0108/05/2023 08:00 AM   Protocol Details HgBA1C procedure resulted in past 6 months    Last HgBA1C < 7.5    Microalbumin procedure in past 12 months or taking ACE/ARB    Appointment in past 6 or next 3 months      Last office visit:  5/13/23    No future appointments.   Last filled:  4/25/23  #90 with 0 refills   Last labs:  5/13/23  HGBA1C:  6.8    Due in Nov for     [facility] and set him up for a physical   Future Appointments   Date Time Provider Breana Asif   8/25/2023  2:15 PM [facility] Le., [facility]

## 2023-08-25 ENCOUNTER — OFFICE VISIT (OUTPATIENT)
Dept: FAMILY MEDICINE CLINIC | Facility: CLINIC | Age: 53
End: 2023-08-25
Payer: COMMERCIAL

## 2023-08-25 VITALS
WEIGHT: 183 LBS | TEMPERATURE: 98 F | BODY MASS INDEX: 26.2 KG/M2 | RESPIRATION RATE: 16 BRPM | HEIGHT: 70 IN | HEART RATE: 92 BPM | OXYGEN SATURATION: 96 % | DIASTOLIC BLOOD PRESSURE: 70 MMHG | SYSTOLIC BLOOD PRESSURE: 100 MMHG

## 2023-08-25 DIAGNOSIS — E78.00 HYPERCHOLESTEROLEMIA: ICD-10-CM

## 2023-08-25 DIAGNOSIS — Z00.00 PREVENTATIVE HEALTH CARE: Primary | ICD-10-CM

## 2023-08-25 DIAGNOSIS — E11.9 TYPE 2 DIABETES MELLITUS WITHOUT COMPLICATION, WITHOUT LONG-TERM CURRENT USE OF INSULIN (HCC): ICD-10-CM

## 2023-08-25 DIAGNOSIS — E11.29 MICROALBUMINURIA DUE TO TYPE 2 DIABETES MELLITUS: ICD-10-CM

## 2023-08-25 DIAGNOSIS — Z12.5 SCREENING FOR PROSTATE CANCER: ICD-10-CM

## 2023-08-25 DIAGNOSIS — R80.9 MICROALBUMINURIA DUE TO TYPE 2 DIABETES MELLITUS: ICD-10-CM

## 2023-08-25 LAB
ANION GAP SERPL CALC-SCNC: 3 MMOL/L (ref 0–18)
BUN BLD-MCNC: 21 MG/DL (ref 7–18)
CALCIUM BLD-MCNC: 9.3 MG/DL (ref 8.5–10.1)
CHLORIDE SERPL-SCNC: 105 MMOL/L (ref 98–112)
CO2 SERPL-SCNC: 27 MMOL/L (ref 21–32)
COMPLEXED PSA SERPL-MCNC: 0.7 NG/ML (ref ?–4)
CREAT BLD-MCNC: 1.06 MG/DL
CREAT UR-SCNC: 208 MG/DL
EGFRCR SERPLBLD CKD-EPI 2021: 83 ML/MIN/1.73M2 (ref 60–?)
EST. AVERAGE GLUCOSE BLD GHB EST-MCNC: 154 MG/DL (ref 68–126)
FASTING STATUS PATIENT QL REPORTED: NO
GLUCOSE BLD-MCNC: 109 MG/DL (ref 70–99)
HBA1C MFR BLD: 7 % (ref ?–5.7)
MICROALBUMIN UR-MCNC: 1.58 MG/DL
MICROALBUMIN/CREAT 24H UR-RTO: 7.6 UG/MG (ref ?–30)
OSMOLALITY SERPL CALC.SUM OF ELEC: 284 MOSM/KG (ref 275–295)
POTASSIUM SERPL-SCNC: 4.5 MMOL/L (ref 3.5–5.1)
SODIUM SERPL-SCNC: 135 MMOL/L (ref 136–145)

## 2023-08-25 PROCEDURE — 99396 PREV VISIT EST AGE 40-64: CPT | Performed by: FAMILY MEDICINE

## 2023-08-25 PROCEDURE — 82043 UR ALBUMIN QUANTITATIVE: CPT | Performed by: FAMILY MEDICINE

## 2023-08-25 PROCEDURE — 83036 HEMOGLOBIN GLYCOSYLATED A1C: CPT | Performed by: FAMILY MEDICINE

## 2023-08-25 PROCEDURE — 3074F SYST BP LT 130 MM HG: CPT | Performed by: FAMILY MEDICINE

## 2023-08-25 PROCEDURE — G0103 PSA SCREENING: HCPCS | Performed by: FAMILY MEDICINE

## 2023-08-25 PROCEDURE — 80048 BASIC METABOLIC PNL TOTAL CA: CPT | Performed by: FAMILY MEDICINE

## 2023-08-25 PROCEDURE — 82570 ASSAY OF URINE CREATININE: CPT | Performed by: FAMILY MEDICINE

## 2023-08-25 PROCEDURE — 3008F BODY MASS INDEX DOCD: CPT | Performed by: FAMILY MEDICINE

## 2023-08-25 PROCEDURE — 3078F DIAST BP <80 MM HG: CPT | Performed by: FAMILY MEDICINE

## 2023-08-25 NOTE — PATIENT INSTRUCTIONS
1. Preventative health care  Reviewed age-appropriate preventive health screen exams as well as immunizations. 2. Type 2 diabetes mellitus without complication, without long-term current use of insulin ([facility]  Reviewed goals for fasting and postmeal blood sugars as well as hemoglobin A1c. Discussed importance of routine foot exams, good fitting footwear and annual dilated retinal exams and glaucoma screening. Patient strongly encouraged to schedule such an exam  - HEMOGLOBIN A1C; Future  - BASIC METABOLIC PANEL (8); Future  - MICROALB/CREAT RATIO, RANDOM URINE; Future  - HEMOGLOBIN W9V  - BASIC METABOLIC PANEL (8)  - MICROALB/CREAT RATIO, RANDOM URINE    3. Hypercholesterolemia  Reviewed goals for lipids. Continue statin therapy, check labs in February    4. Microalbuminuria due to type 2 diabetes mellitus   Discussed importance of good glycemic control to prevent progression to renal disease  - MICROALB/CREAT RATIO, RANDOM URINE; Future  - MICROALB/CREAT RATIO, RANDOM URINE    5.  Screening for prostate cancer  Continue annual surveillance  - PSA TOTAL, SCREEN; Future  - PSA TOTAL, SCREEN

## 2023-08-26 DIAGNOSIS — Z12.5 SCREENING FOR PROSTATE CANCER: ICD-10-CM

## 2023-08-26 DIAGNOSIS — E11.9 TYPE 2 DIABETES MELLITUS WITHOUT COMPLICATION, WITHOUT LONG-TERM CURRENT USE OF INSULIN (HCC): Primary | ICD-10-CM

## 2023-09-29 ENCOUNTER — TELEPHONE (OUTPATIENT)
Dept: FAMILY MEDICINE CLINIC | Facility: CLINIC | Age: 53
End: 2023-09-29

## 2023-10-21 NOTE — TELEPHONE ENCOUNTER
OV 08/25  Refill 07/21 #90day  LABS 08/25  LVM FOR PT TO C/B - NEED VERIFICATION ON HOW MANY TABS DAILY. Requested Prescriptions     Pending Prescriptions Disp Refills    ATORVASTATIN 40 MG Oral Tab [Pharmacy Med Name: ATORVASTATIN 40 MG TABLET] 90 tablet 0     Sig: TAKE 1 TABLET BY MOUTH EVERY DAY AT NIGHT    METFORMIN  MG Oral Tablet 24 Hr [Pharmacy Med Name: METFORMIN HCL  MG TABLET] 270 tablet 0     Sig: TAKE 2 TABLETS BY MOUTH EVERY MORNING AND 1 TABLET EVERY EVENING     No future appointments.

## 2023-10-23 RX ORDER — GLIMEPIRIDE 1 MG/1
1 TABLET ORAL
Qty: 90 TABLET | Refills: 0 | Status: SHIPPED | OUTPATIENT
Start: 2023-10-23

## 2023-10-23 RX ORDER — METFORMIN HYDROCHLORIDE 500 MG/1
1000 TABLET, EXTENDED RELEASE ORAL 2 TIMES DAILY WITH MEALS
Qty: 360 TABLET | Refills: 0 | Status: SHIPPED | OUTPATIENT
Start: 2023-10-23

## 2023-10-23 RX ORDER — ATORVASTATIN CALCIUM 40 MG/1
TABLET, FILM COATED ORAL
Qty: 90 TABLET | Refills: 0 | Status: SHIPPED | OUTPATIENT
Start: 2023-10-23

## 2023-10-23 NOTE — TELEPHONE ENCOUNTER
Spoke with pt - taking Metformin 2TABS BID. Wanted to add Glimepiride to refill requests. Requested Prescriptions     Pending Prescriptions Disp Refills    ATORVASTATIN 40 MG Oral Tab [Pharmacy Med Name: ATORVASTATIN 40 MG TABLET] 90 tablet 0     Sig: TAKE 1 TABLET BY MOUTH EVERY DAY AT NIGHT    metFORMIN  MG Oral Tablet 24 Hr [Pharmacy Med Name: METFORMIN HCL  MG TABLET] 360 tablet 0     Sig: Take 2 tablets (1,000 mg total) by mouth 2 (two) times daily with meals. TAKE 2 TABS IN THE MORNING AND 2 TABS IN THE EVENING.     glimepiride 1 MG Oral Tab 90 tablet 0     Sig: Take 1 tablet (1 mg total) by mouth daily with breakfast.

## 2023-11-13 RX ORDER — LISINOPRIL 2.5 MG/1
TABLET ORAL
Qty: 90 TABLET | Refills: 0 | Status: SHIPPED | OUTPATIENT
Start: 2023-11-13

## 2023-11-13 NOTE — TELEPHONE ENCOUNTER
OV 08/25  REFILL 10/2022 #90 +1 RF  LABS 08/25    Requested Prescriptions     Pending Prescriptions Disp Refills    LISINOPRIL 2.5 MG Oral Tab [Pharmacy Med Name: LISINOPRIL 2.5 MG TABLET] 90 tablet 3     Sig: TAKE 1 TABLET BY MOUTH EVERY DAY     No future appointments.

## 2023-11-14 NOTE — TELEPHONE ENCOUNTER
Last refill: 4/22/21 #90 w/ 0 refills  Last OV: 4/19/21  Last labs: 5/29/21    No future appointments.     Diabetic Medication Protocol [id number]/16/2021 12:32 AM   HgBA1C procedure resulted in past 6 months Protocol Details    Last HgBA1C < 7.5     Microalb
(3) Alterations in Oxygenation (Respiratory Diagnosis, Dehydration, Anemia, Anorexia, Syncope/Dizziness, etc.)

## 2024-01-18 RX ORDER — ATORVASTATIN CALCIUM 40 MG/1
TABLET, FILM COATED ORAL
Qty: 90 TABLET | Refills: 0 | Status: SHIPPED | OUTPATIENT
Start: 2024-01-18

## 2024-01-18 RX ORDER — METFORMIN HYDROCHLORIDE 500 MG/1
1000 TABLET, EXTENDED RELEASE ORAL 2 TIMES DAILY WITH MEALS
Qty: 360 TABLET | Refills: 0 | Status: SHIPPED | OUTPATIENT
Start: 2024-01-18

## 2024-01-18 RX ORDER — GLIMEPIRIDE 1 MG/1
1 TABLET ORAL
Qty: 90 TABLET | Refills: 0 | Status: SHIPPED | OUTPATIENT
Start: 2024-01-18

## 2024-01-18 NOTE — TELEPHONE ENCOUNTER
Metformin  Last refill: 10/23/23  Qty: 360  W/ 0 refills  Last ov: 08/25/23    Atorvastatin   Last refill: 10/23/23  Qty: 90  W/ 0 refills  Last ov 08/25/23    Glimepiride   Last refill: 10/23/23  qtY: 90  W/ 0 refills  Last ov 08/25/23      Requested Prescriptions     Pending Prescriptions Disp Refills    METFORMIN  MG Oral Tablet 24 Hr [Pharmacy Med Name: METFORMIN HCL  MG TABLET] 360 tablet 0     Sig: TAKE 2 TABLETS BY MOUTH IN THE MORNING AND 2 TABLETS IN THE EVENING WITH MEALS    ATORVASTATIN 40 MG Oral Tab [Pharmacy Med Name: ATORVASTATIN 40 MG TABLET] 90 tablet 0     Sig: TAKE 1 TABLET BY MOUTH EVERY DAY AT NIGHT    GLIMEPIRIDE 1 MG Oral Tab [Pharmacy Med Name: GLIMEPIRIDE 1 MG TABLET] 90 tablet 0     Sig: TAKE 1 TABLET BY MOUTH DAILY WITH BREAKFAST.     No future appointments.

## 2024-03-06 RX ORDER — LISINOPRIL 2.5 MG/1
TABLET ORAL
Qty: 90 TABLET | Refills: 0 | Status: SHIPPED | OUTPATIENT
Start: 2024-03-06

## 2024-03-06 RX ORDER — METFORMIN HYDROCHLORIDE 500 MG/1
1000 TABLET, EXTENDED RELEASE ORAL 2 TIMES DAILY WITH MEALS
Qty: 120 TABLET | Refills: 0 | Status: SHIPPED | OUTPATIENT
Start: 2024-03-06

## 2024-03-06 NOTE — TELEPHONE ENCOUNTER
Last office visit: 8/25/23   Last filled: 11/13/23 #90 with 0 RF   Last labs: 8/25/23     No future appointments.    Protocol:   Hypertension Medications Protocol [id number]/05/2024 04:44 PM   Protocol Details CMP or BMP in past 12 months    Last BP reading less than 140/90    In person appointment or virtual visit in the past 12 mos or appointment in next 3 mos    EGFRCR or GFRNAA > 50

## 2024-03-06 NOTE — TELEPHONE ENCOUNTER
OV 08/2023  LABS 08/2023    REFILL 01/18/24 #360    No future appointments. MCM SEENT - NEEDS OV AND LABS. 30 DAYS ONLY

## 2024-05-15 ENCOUNTER — TELEPHONE (OUTPATIENT)
Dept: FAMILY MEDICINE CLINIC | Facility: CLINIC | Age: 54
End: 2024-05-15

## 2024-05-15 RX ORDER — GLIMEPIRIDE 1 MG/1
1 TABLET ORAL
Qty: 90 TABLET | Refills: 0 | Status: SHIPPED | OUTPATIENT
Start: 2024-05-15

## 2024-05-15 RX ORDER — LISINOPRIL 2.5 MG/1
2.5 TABLET ORAL DAILY
Qty: 90 TABLET | Refills: 0 | Status: SHIPPED | OUTPATIENT
Start: 2024-05-15

## 2024-05-15 RX ORDER — ATORVASTATIN CALCIUM 40 MG/1
40 TABLET, FILM COATED ORAL NIGHTLY
Qty: 90 TABLET | Refills: 0 | Status: SHIPPED | OUTPATIENT
Start: 2024-05-15

## 2024-05-15 NOTE — TELEPHONE ENCOUNTER
Last OV 8/25/23    Pt advised he was due for OV and A1c in Feb.  Asked pt to schedule OV.    OV scheduled on 5/29.  Pt states he recently had labwork done for life insurance, including an A1c.    Pt advised to bring those results with him

## 2024-05-15 NOTE — TELEPHONE ENCOUNTER
Patient needs refill, LISINOPRIL 2.5 MG Oral Tab, ATORVASTATIN 40 MG Oral Tab and GLIMEPIRIDE 1 MG Oral Tab, [facility].

## 2024-05-29 ENCOUNTER — OFFICE VISIT (OUTPATIENT)
Dept: FAMILY MEDICINE CLINIC | Facility: CLINIC | Age: 54
End: 2024-05-29

## 2024-05-29 VITALS
TEMPERATURE: 97 F | HEIGHT: 70 IN | OXYGEN SATURATION: 99 % | DIASTOLIC BLOOD PRESSURE: 78 MMHG | HEART RATE: 58 BPM | WEIGHT: 190.81 LBS | RESPIRATION RATE: 18 BRPM | BODY MASS INDEX: 27.32 KG/M2 | SYSTOLIC BLOOD PRESSURE: 116 MMHG

## 2024-05-29 DIAGNOSIS — E11.9 TYPE 2 DIABETES MELLITUS WITHOUT COMPLICATION, WITHOUT LONG-TERM CURRENT USE OF INSULIN (HCC): Primary | ICD-10-CM

## 2024-05-29 DIAGNOSIS — E78.5 ELEVATED LIPIDS: ICD-10-CM

## 2024-05-29 DIAGNOSIS — E78.00 HYPERCHOLESTEROLEMIA: ICD-10-CM

## 2024-05-29 DIAGNOSIS — Z12.5 SCREENING FOR PROSTATE CANCER: ICD-10-CM

## 2024-05-29 LAB
CHOLESTEROL, TOTAL: 115
GFR NON-AFRICAN AMERICAN: 99
HDL CHOL: 37
HGBA1C: 7.3 %
LDL CHOLESTEROL: 59 MG/DL (ref ?–130)
MICROALB/CREAT RATIO: 7.04 UG/MG CREAT
PSA: 0.59
TRIGLYCERIDES: 91

## 2024-05-29 PROCEDURE — 3051F HG A1C>EQUAL 7.0%<8.0%: CPT | Performed by: FAMILY MEDICINE

## 2024-05-29 PROCEDURE — 99214 OFFICE O/P EST MOD 30 MIN: CPT | Performed by: FAMILY MEDICINE

## 2024-05-29 PROCEDURE — 3061F NEG MICROALBUMINURIA REV: CPT | Performed by: FAMILY MEDICINE

## 2024-05-29 PROCEDURE — 3074F SYST BP LT 130 MM HG: CPT | Performed by: FAMILY MEDICINE

## 2024-05-29 PROCEDURE — 3078F DIAST BP <80 MM HG: CPT | Performed by: FAMILY MEDICINE

## 2024-05-29 PROCEDURE — 3008F BODY MASS INDEX DOCD: CPT | Performed by: FAMILY MEDICINE

## 2024-05-29 NOTE — PROGRESS NOTES
Scott Blancas . is a 53 year old male.  Chief Complaint   Patient presents with    Diabetes    Lab Results     Patient brought in labs he had done for life insurance          HPI:   Pt had labs done for insurance, results on his phone    Current Outpatient Medications   Medication Sig Dispense Refill    lisinopril 2.5 MG Oral Tab Take 1 tablet (2.5 mg total) by mouth daily. 90 tablet 0    atorvastatin 40 MG Oral Tab Take 1 tablet (40 mg total) by mouth nightly. 90 tablet 0    glimepiride 1 MG Oral Tab Take 1 tablet (1 mg total) by mouth daily with breakfast. 90 tablet 0    metFORMIN  MG Oral Tablet 24 Hr Take 2 tablets (1,000 mg total) by mouth 2 (two) times daily with meals. 120 tablet 0    Blood Glucose Monitoring Suppl (BLOOD GLUCOSE MONITOR SYSTEM) w/Device Does not apply Kit As directed----to check blood glucose once daily 1 kit 0    Glucose Blood (BLOOD GLUCOSE TEST) In Vitro Strip As directed----to check blood glucose once daily 100 strip 3    Lancets Does not apply Misc As directed---to check blood glucose once daily 100 each 3      Past Medical History:    Diabetes mellitus (HCC)    High cholesterol      Social History:  Social History     Socioeconomic History    Marital status:    Tobacco Use    Smoking status: Former     Current packs/day: 0.00     Types: Cigarettes     Quit date: 1996     Years since quittin.2    Smokeless tobacco: Never   Vaping Use    Vaping status: Never Used   Substance and Sexual Activity    Alcohol use: Yes     Alcohol/week: 3.0 standard drinks of alcohol     Types: 3 Cans of beer per week     Comment: a little bit but not much    Drug use: Yes     Types: Cannabis     Comment: once a week      24 oz coffee, rare alcohol on weekends  REVIEW OF SYSTEMS:,   GENERAL: generally feels well, no fatigue, denies excessive daytime drowsiness, good appetite, wt up 7# since last visit  SKIN: denies any unusual skin lesions or rashes  EYES:denies blurred vision or  double vision, glasses/ contacts: + progressive lens, last eye exam:9/28/23 @ [facility]  ENT: denies nasal congestion, PND or ST, denies snoring and reported periods of apnea, denies hearing deficits  LUNGS: denies shortness of breath with exertion, no coughing or wheezing  CARDIOVASCULAR: denies chest pain on exertion, palpations, anginal equivalent symptoms, no claudication , no peripheral edema  GI: denies abdominal pain,denies heartburn, constipation, diarrhea, or change in bowel habits  : denies dysuria, hesitancy,nocturia, decreased urine stream, incontinence, erectile dysfunction, decreased libido   MUSCULOSKELETAL: denies back or joint pain  NEURO: denies headaches, tremors, dizziness, numbness and weakness  PSYCHE: denies depression or anxiety  HEMATOLOGIC: denies unexplained bruising or bleeding  ENDOCRINE: denies heat or cold intolerance , no unexplained wt loss or gain, not checking BS   EXAM:   /78 (BP Location: Left arm, Patient Position: Sitting, Cuff Size: adult)   Pulse 58   Temp 97.3 °F (36.3 °C) (Temporal)   Resp 18   Ht 5' 10\" (1.778 m)   Wt 190 lb 12.8 oz (86.5 kg)   SpO2 99%   BMI 27.38 kg/m²   GENERAL: well developed, well nourished,in no apparent distress  SKIN: no rashes,no suspicious lesions  ENT: EAC:  Clear, TMs: intact, Nose: turbinates normal, septum: midline, discharge: none, Pharynx: class 2 airway, no oral lesions  EYES:PERRLA, EOMI, normal optic disk,conjunctiva are clear  NECK: supple,no adenopathy,no bruits, no thyromegaly  LUNGS: clear to auscultation, no w/r/r  CARDIO: RRR without murmur, no S3, S4, strong peripheral pulses, no peripheral edema  BILATERAL FOOT EXAM OF PLANTAR ASPECT: WITHOUT CALLUS, ULCERS OR GROSS DEFORMITIES, SENSATION INTACT TO MONOFILAMENT TESTING, GOOD DP / PT PULSES, NAILS W/O DYSTROPHIC CHANGES  NEURO: A &O X 3,cranial nerves are intact,motor and sensory are grossly intact, DTRs +2/4 UE/LE bilaterally  PSYCH: affect: bright, slightly  anxious, speech: clear and coherent, Insight: appropriate  ASSESSMENT AND PLAN:     Encounter Diagnoses   Name Primary?    Type 2 diabetes mellitus without complication, without long-term current use of insulin (HCC) Yes    Hypercholesterolemia     Screening for prostate cancer     Elevated lipids     BMI 27.0-27.9,adult      Orders Placed This Encounter   Procedures    Lipid Panel     Meds & Refills for this Visit:  Requested Prescriptions      No prescriptions requested or ordered in this encounter     Imaging & Consults:  None  No follow-ups on file.  Patient Instructions   I reviewed patient's recent labs as well as goals for lipids, blood sugar, A1c etc.  Medication list reviewed  I would like patient to work on healthier food choices, 10 pound weight loss and increased aerobic activity rather than increasing meds.  Will recheck A1c in 4 to 6 months and if no improvement, will need to add additional medication  Continue statin therapy and recheck labs annually  Continue annual prostate cancer surveillance  Discussed importance of annual dilated retinal exams and glaucoma screening, patient be due for follow-up in September.  Discussed importance of routine foot exams and good fitting footwear.   The patient indicates understanding of these issues and agrees to the plan.    Billy Monteiro DO, FAAFP

## 2024-05-29 NOTE — PATIENT INSTRUCTIONS
I reviewed patient's recent labs as well as goals for lipids, blood sugar, A1c etc.  Medication list reviewed  I would like patient to work on healthier food choices, 10 pound weight loss and increased aerobic activity rather than increasing meds.  Will recheck A1c in 4 to 6 months and if no improvement, will need to add additional medication  Continue statin therapy and recheck labs annually  Continue annual prostate cancer surveillance  Discussed importance of annual dilated retinal exams and glaucoma screening, patient be due for follow-up in September.  Discussed importance of routine foot exams and good fitting footwear.

## 2024-06-01 RX ORDER — METFORMIN HYDROCHLORIDE 500 MG/1
1000 TABLET, EXTENDED RELEASE ORAL 2 TIMES DAILY WITH MEALS
Qty: 120 TABLET | Refills: 0 | Status: SHIPPED | OUTPATIENT
Start: 2024-06-01 | End: 2024-07-01

## 2024-06-01 NOTE — TELEPHONE ENCOUNTER
OV 05/29/24  LABS 08/23 - lipids 05/24    REFILL 03/06/24 #120    No future appointments. Mychart sent, overdue for labs. Needs to schedule.

## 2024-06-06 ENCOUNTER — TELEPHONE (OUTPATIENT)
Dept: FAMILY MEDICINE CLINIC | Facility: CLINIC | Age: 54
End: 2024-06-06

## 2024-06-06 RX ORDER — METFORMIN HYDROCHLORIDE 500 MG/1
1000 TABLET, EXTENDED RELEASE ORAL 2 TIMES DAILY WITH MEALS
Qty: 360 TABLET | Refills: 1 | Status: SHIPPED | OUTPATIENT
Start: 2024-06-06

## 2024-06-06 NOTE — TELEPHONE ENCOUNTER
Rec'd fax from [facility]/ [place] re: metformin refill that was sent on 6/1 for #120.  It states pt's insurance requires a 90 day supply. New script sent    Last OV 5/29/24    Pt had A1C (and other labwork) done through life insurance and brought results in to that appt for Dr. Monteiro to review.  Dr. Monteiro said to recheck A1c 4-6 months later---future order placed.

## 2024-07-11 RX ORDER — METFORMIN HYDROCHLORIDE 500 MG/1
1000 TABLET, EXTENDED RELEASE ORAL 2 TIMES DAILY WITH MEALS
Qty: 360 TABLET | Refills: 0 | Status: SHIPPED | OUTPATIENT
Start: 2024-07-11

## 2024-07-11 NOTE — TELEPHONE ENCOUNTER
metFORMIN  MG Oral Tablet 24 Hr     Diabetes Medication Protocol [id number]/10/2024 07:46 PM   Protocol Details Last A1C < 7.5 and within past 6 months    In person appointment or virtual visit in the past 6 mos or appointment in next 3 mos    Microalbumin procedure in past 12 months or taking ACE/ARB    EGFRCR or GFRNAA > 50    GFR in the past 12 months      Last office visit:  5/29/24    No future appointments.  Last filled:  6/6/24  #360 with 1   Last labs:  8/25/23  A1C:7.0  eGFR: 83

## 2024-12-26 RX ORDER — LISINOPRIL 2.5 MG/1
2.5 TABLET ORAL DAILY
Qty: 90 TABLET | Refills: 0 | OUTPATIENT
Start: 2024-12-26

## 2024-12-26 RX ORDER — GLIMEPIRIDE 1 MG/1
1 TABLET ORAL
Qty: 90 TABLET | Refills: 0 | OUTPATIENT
Start: 2024-12-26

## 2024-12-26 RX ORDER — ATORVASTATIN CALCIUM 40 MG/1
40 TABLET, FILM COATED ORAL NIGHTLY
Qty: 90 TABLET | Refills: 0 | OUTPATIENT
Start: 2024-12-26

## 2024-12-26 NOTE — TELEPHONE ENCOUNTER
OV 05/29/24  LABS 05/29/24 external LIPID    REFILL  -Atorvastatin 40 mg 05/15/24 #90  -Lisinopril 2.5 mg 05/15/24 #90  -Glimepiride 1 mg 05/15/24 #90    No future appointments.     BP Readings from Last 3 Encounters:   05/29/24 116/78   08/25/23 100/70   05/13/23 110/69     Calling patient to schedule physical and labs - states he actually does not need refills on above medications, he is not very good at being compliant with them.     Future Appointments   Date Time Provider Department Center   1/18/2025  8:30 AM Billy Monteiro, DO [facility]

## 2025-01-18 ENCOUNTER — APPOINTMENT (OUTPATIENT)
Dept: GENERAL RADIOLOGY | Age: 55
End: 2025-01-18
Attending: NURSE PRACTITIONER
Payer: COMMERCIAL

## 2025-01-18 ENCOUNTER — HOSPITAL ENCOUNTER (OUTPATIENT)
Age: 55
Discharge: HOME | End: 2025-01-18
Payer: COMMERCIAL

## 2025-01-18 VITALS
HEART RATE: 89 BPM | OXYGEN SATURATION: 98 % | SYSTOLIC BLOOD PRESSURE: 147 MMHG | RESPIRATION RATE: 18 BRPM | DIASTOLIC BLOOD PRESSURE: 91 MMHG | TEMPERATURE: 98 F

## 2025-01-18 DIAGNOSIS — M79.10 MYALGIA: Primary | ICD-10-CM

## 2025-01-18 DIAGNOSIS — J11.1 INFLUENZA: ICD-10-CM

## 2025-01-18 DIAGNOSIS — R05.1 ACUTE COUGH: ICD-10-CM

## 2025-01-18 LAB
CLARITY UR: CLEAR
GLUCOSE UR STRIP-MCNC: 100 MG/DL
HGB UR QL STRIP: NEGATIVE
KETONES UR STRIP-MCNC: 40 MG/DL
LEUKOCYTE ESTERASE UR QL STRIP: NEGATIVE
NITRITE UR QL STRIP: NEGATIVE
PH UR STRIP: 6 [PH]
POCT INFLUENZA A: POSITIVE
POCT INFLUENZA B: NEGATIVE
PROT UR STRIP-MCNC: 100 MG/DL
SARS-COV-2 RNA RESP QL NAA+PROBE: NOT DETECTED
SP GR UR STRIP: >=1.03
UROBILINOGEN UR STRIP-ACNC: <2 MG/DL

## 2025-01-18 PROCEDURE — 71046 X-RAY EXAM CHEST 2 VIEWS: CPT | Performed by: NURSE PRACTITIONER

## 2025-01-18 PROCEDURE — 87502 INFLUENZA DNA AMP PROBE: CPT | Performed by: NURSE PRACTITIONER

## 2025-01-18 PROCEDURE — U0002 COVID-19 LAB TEST NON-CDC: HCPCS | Performed by: NURSE PRACTITIONER

## 2025-01-18 PROCEDURE — 99204 OFFICE O/P NEW MOD 45 MIN: CPT | Performed by: NURSE PRACTITIONER

## 2025-01-18 PROCEDURE — 81002 URINALYSIS NONAUTO W/O SCOPE: CPT | Performed by: NURSE PRACTITIONER

## 2025-01-18 RX ORDER — OSELTAMIVIR PHOSPHATE 75 MG/1
75 CAPSULE ORAL 2 TIMES DAILY
Qty: 10 CAPSULE | Refills: 0 | Status: SHIPPED | OUTPATIENT
Start: 2025-01-18

## 2025-01-18 NOTE — ED PROVIDER NOTES
Patient Seen in: [facility]      History     Chief Complaint   Patient presents with    Headache    Abdomen/Flank Pain     Stated Complaint: head hurts; chills; stomach pain    Subjective:   HPI      54-year-old male with diabetes presents today with complaints of headache, body aches, chills, stomachache, back pain that started Thursday.  Patient denies any known COVID or flu exposure.  Patient states he ate a little bit yesterday but has not had quite the same appetite.  Patient denies any diarrhea.    Objective:     No pertinent past medical history.            No pertinent past surgical history.              No pertinent social history.            Review of Systems   Constitutional:  Positive for appetite change and chills.   HENT: Negative.     Eyes: Negative.    Respiratory:  Positive for cough.    Cardiovascular: Negative.    Gastrointestinal:  Positive for abdominal pain.   Endocrine: Negative.    Genitourinary: Negative.    Musculoskeletal:  Positive for myalgias.   Skin: Negative.    Allergic/Immunologic: Negative.    Neurological: Negative.    Hematological: Negative.    Psychiatric/Behavioral: Negative.         Positive for stated complaint: head hurts; chills; stomach pain  Other systems are as noted in HPI.  Constitutional and vital signs reviewed.      All other systems reviewed and negative except as noted above.    Physical Exam     ED Triage Vitals [01/18/25 0838]   BP (!) 147/91   Pulse 89   Resp 18   Temp 98.4 °F (36.9 °C)   Temp src Oral   SpO2 98 %   O2 Device None (Room air)       Current Vitals:   Vital Signs  BP: (!) 147/91  Pulse: 89  Resp: 18  Temp: 98.4 °F (36.9 °C)  Temp src: Oral    Oxygen Therapy  SpO2: 98 %  O2 Device: None (Room air)        Physical Exam  Vitals and nursing note reviewed.   Constitutional:       Appearance: He is well-developed and normal weight.   HENT:      Head: Normocephalic.      Mouth/Throat:      Mouth: Mucous membranes are moist.      Pharynx:  Oropharynx is clear.   Eyes:      Extraocular Movements: Extraocular movements intact.      Pupils: Pupils are equal, round, and reactive to light.   Cardiovascular:      Rate and Rhythm: Normal rate and regular rhythm.      Heart sounds: Normal heart sounds.   Pulmonary:      Effort: Pulmonary effort is normal.      Breath sounds: Normal breath sounds.   Abdominal:      General: Abdomen is protuberant. Bowel sounds are normal.      Palpations: Abdomen is soft.      Tenderness: There is no abdominal tenderness.   Skin:     General: Skin is warm.      Capillary Refill: Capillary refill takes less than 2 seconds.   Neurological:      General: No focal deficit present.      Mental Status: He is alert.   Psychiatric:         Mood and Affect: Mood normal.            ED Course     Labs Reviewed   [facility] POCT URINALYSIS DIPSTICK - Abnormal; Notable for the following components:       Result Value    Protein urine 100 (*)     Glucose, Urine 100 (*)     Ketone, Urine 40 (*)     Bilirubin, Urine Small (*)     All other components within normal limits   POCT FLU TEST - Abnormal; Notable for the following components:    POCT INFLUENZA A Positive (*)     All other components within normal limits    Narrative:     This assay is a rapid molecular in vitro test utilizing nucleic acid amplification of influenza A and B viral RNA.   RAPID SARS-COV-2 BY PCR - Normal                   MDM      54-year-old male with diabetes presents today with complaints of headache, body aches, chills, stomachache, back pain that started Thursday.  Patient denies any known COVID or flu exposure.  Patient states he ate a little bit yesterday but has not had quite the same appetite.  Patient denies any diarrhea.  Vital signs: Please see EMR.  Physical exam: Please see exam.  Differential diagnosis: COVID, flu, viral illness, pneumonia.  Recent Results (from the past 24 hours)   Rapid SARS-CoV-2 by PCR    Collection Time: 01/18/25  8:42 AM    Specimen:  Nares; Other   Result Value Ref Range    Rapid SARS-CoV-2 by PCR Not Detected Not Detected   POCT Flu Test    Collection Time: 01/18/25  8:42 AM    Specimen: Nares; Other   Result Value Ref Range    POCT INFLUENZA A Positive (A) Negative    POCT INFLUENZA B Negative Negative   POCT Urinalysis Dipstick    Collection Time: 01/18/25  8:52 AM   Result Value Ref Range    Urine Color Dark yellow Yellow    Urine Clarity Clear Clear    Specific Gravity, Urine >=1.030 1.005 - 1.030    PH, Urine 6.0 5.0 - 8.0    Protein urine 100 (A) Negative mg/dL    Glucose, Urine 100 (A) Negative mg/dL    Ketone, Urine 40 (A) Negative mg/dL    Bilirubin, Urine Small (A) Negative    Blood, Urine Negative Negative    Nitrite Urine Negative Negative    Urobilinogen urine <2.0 <2.0 mg/dL    Leukocyte esterase urine Negative Negative     XR CHEST PA + LAT CHEST (CPT=71046)    Result Date: 1/18/2025  CONCLUSION:  No acute disease.    LOCATION:  [facility]   Dictated by (CST): Ciaran Ragsdale MD on 1/18/2025 at 9:10 AM     Finalized by (CST): Ciaran Ragsdale MD on 1/18/2025 at 9:10 AM      Based on physical exam and HPI will diagnosed with influenza.  Prescribe Tamiflu due to patient's history of dyslipidemia and diabetes.  Patient instructed to replace his toothbrush.  ED precautions given.          Medical Decision Making  54-year-old male with diabetes presents today with complaints of headache, body aches, chills, stomachache, back pain that started Thursday.  Patient denies any known COVID or flu exposure.  Patient states he ate a little bit yesterday but has not had quite the same appetite.  Patient denies any diarrhea.    Problems Addressed:  Acute cough: acute illness or injury  Influenza: acute illness or injury  Myalgia: acute illness or injury    Amount and/or Complexity of Data Reviewed  Labs: ordered. Decision-making details documented in ED Course.  Radiology: ordered. Decision-making details documented in ED Course.    Risk  OTC  drugs.  Prescription drug management.        Disposition and Plan     Clinical Impression:  1. Myalgia    2. Acute cough    3. Influenza         Disposition:  Discharge  1/18/2025  9:14 am    Follow-up:  Billy Monteiro, DO  [address] [facility] [address]  [phone]    In 1 week            Medications Prescribed:  Discharge Medication List as of 1/18/2025  9:16 AM        START taking these medications    Details   oseltamivir (TAMIFLU) 75 MG Oral Cap Take 1 capsule (75 mg total) by mouth 2 (two) times daily., Normal, Disp-10 capsule, R-0                 Supplementary Documentation:

## 2025-01-18 NOTE — ED INITIAL ASSESSMENT (HPI)
Pt sts 2 days ago began with cough, HA, chills, fatigue, body aches. Yesterday began with constant upper abd pain, loose stool. Denies nausea.

## 2025-01-18 NOTE — DISCHARGE INSTRUCTIONS
Replace your toothbrush   Your dose of acetaminophen (Tylenol) is 1000 mg every 4 hours for pain or fevers as needed.     Your dose of ibuprofen is 600 mg every 6 hours for pain or fevers as needed.

## 2025-01-24 ENCOUNTER — TELEPHONE (OUTPATIENT)
Dept: FAMILY MEDICINE CLINIC | Facility: CLINIC | Age: 55
End: 2025-01-24

## 2025-01-24 RX ORDER — GLIMEPIRIDE 1 MG/1
1 TABLET ORAL
Qty: 30 TABLET | Refills: 0 | Status: SHIPPED | OUTPATIENT
Start: 2025-01-24 | End: 2025-02-23

## 2025-01-24 NOTE — TELEPHONE ENCOUNTER
Last office visit: 5/29/24  Last refill: 5/15/24  LAST A1C 2/25/23  Future Appointments   Date Time Provider Department Center   1/31/2025  9:00 AM Billy Monteiro, DO [facility]

## 2025-01-25 RX ORDER — GLIMEPIRIDE 1 MG/1
1 TABLET ORAL
Qty: 90 TABLET | Refills: 0 | OUTPATIENT
Start: 2025-01-25

## 2025-01-25 NOTE — TELEPHONE ENCOUNTER
GLIMEPIRIDE 1 MG Oral Tab     Diabetes Medication Protocol [id number]/24/2025 09:25 AM   Protocol Details Last A1C < 7.5 and within past 6 months    EGFRCR or GFRNAA > 50    GFR in the past 12 months    In person appointment or virtual visit in the past 6 mos or appointment in next 3 mos    Microalbumin procedure in past 12 months or taking ACE/ARB    Medication is active on med list      Last office visit:  5/29/24    Future Appointments   Date Time Provider Department Center   1/31/2025  9:00 AM Billy Monteiro, DO [facility] [place]   Last filled:  1/24/23  #30   Last labs:  8/25/23  7.0  eGFR-Cr: 83 microalbumin done   Due for labs.    Duplicate request

## 2025-01-31 ENCOUNTER — LABORATORY ENCOUNTER (OUTPATIENT)
Dept: LAB | Age: 55
End: 2025-01-31
Attending: FAMILY MEDICINE
Payer: COMMERCIAL

## 2025-01-31 ENCOUNTER — OFFICE VISIT (OUTPATIENT)
Dept: FAMILY MEDICINE CLINIC | Facility: CLINIC | Age: 55
End: 2025-01-31
Payer: COMMERCIAL

## 2025-01-31 VITALS
WEIGHT: 187 LBS | OXYGEN SATURATION: 98 % | TEMPERATURE: 98 F | RESPIRATION RATE: 16 BRPM | HEIGHT: 70.08 IN | SYSTOLIC BLOOD PRESSURE: 114 MMHG | BODY MASS INDEX: 26.77 KG/M2 | HEART RATE: 90 BPM | DIASTOLIC BLOOD PRESSURE: 74 MMHG

## 2025-01-31 DIAGNOSIS — N40.1 BENIGN PROSTATIC HYPERPLASIA WITH WEAK URINARY STREAM: ICD-10-CM

## 2025-01-31 DIAGNOSIS — E78.00 HYPERCHOLESTEROLEMIA: ICD-10-CM

## 2025-01-31 DIAGNOSIS — Z12.5 SCREENING FOR PROSTATE CANCER: ICD-10-CM

## 2025-01-31 DIAGNOSIS — E11.9 TYPE 2 DIABETES MELLITUS WITHOUT COMPLICATION, WITHOUT LONG-TERM CURRENT USE OF INSULIN (HCC): ICD-10-CM

## 2025-01-31 DIAGNOSIS — Z00.00 PREVENTATIVE HEALTH CARE: Primary | ICD-10-CM

## 2025-01-31 DIAGNOSIS — Z51.81 MEDICATION MONITORING ENCOUNTER: ICD-10-CM

## 2025-01-31 DIAGNOSIS — R39.12 BENIGN PROSTATIC HYPERPLASIA WITH WEAK URINARY STREAM: ICD-10-CM

## 2025-01-31 DIAGNOSIS — N52.9 VASCULOGENIC ERECTILE DYSFUNCTION, UNSPECIFIED VASCULOGENIC ERECTILE DYSFUNCTION TYPE: ICD-10-CM

## 2025-01-31 DIAGNOSIS — Z00.00 PREVENTATIVE HEALTH CARE: ICD-10-CM

## 2025-01-31 LAB
ALBUMIN SERPL-MCNC: 4.9 G/DL (ref 3.2–4.8)
ALBUMIN/GLOB SERPL: 2 {RATIO} (ref 1–2)
ALP LIVER SERPL-CCNC: 75 U/L
ALT SERPL-CCNC: 109 U/L
ANION GAP SERPL CALC-SCNC: 13 MMOL/L (ref 0–18)
AST SERPL-CCNC: 42 U/L (ref ?–34)
BILIRUB SERPL-MCNC: 0.9 MG/DL (ref 0.3–1.2)
BUN BLD-MCNC: 21 MG/DL (ref 9–23)
CALCIUM BLD-MCNC: 9.6 MG/DL (ref 8.7–10.6)
CHLORIDE SERPL-SCNC: 102 MMOL/L (ref 98–112)
CHOLEST SERPL-MCNC: 137 MG/DL (ref ?–200)
CO2 SERPL-SCNC: 24 MMOL/L (ref 21–32)
COMPLEXED PSA SERPL-MCNC: 0.74 NG/ML (ref ?–4)
CREAT BLD-MCNC: 0.96 MG/DL
CREAT UR-SCNC: 153.7 MG/DL
EGFRCR SERPLBLD CKD-EPI 2021: 93 ML/MIN/1.73M2 (ref 60–?)
EST. AVERAGE GLUCOSE BLD GHB EST-MCNC: 220 MG/DL (ref 68–126)
FASTING PATIENT LIPID ANSWER: YES
FASTING STATUS PATIENT QL REPORTED: YES
GLOBULIN PLAS-MCNC: 2.5 G/DL (ref 2–3.5)
GLUCOSE BLD-MCNC: 209 MG/DL (ref 70–99)
HBA1C MFR BLD: 9.3 % (ref ?–5.7)
HDLC SERPL-MCNC: 32 MG/DL (ref 40–59)
LDLC SERPL CALC-MCNC: 81 MG/DL (ref ?–100)
MICROALBUMIN UR-MCNC: 2 MG/DL
MICROALBUMIN/CREAT 24H UR-RTO: 13 UG/MG (ref ?–30)
NONHDLC SERPL-MCNC: 105 MG/DL (ref ?–130)
OSMOLALITY SERPL CALC.SUM OF ELEC: 297 MOSM/KG (ref 275–295)
POTASSIUM SERPL-SCNC: 4.6 MMOL/L (ref 3.5–5.1)
PROT SERPL-MCNC: 7.4 G/DL (ref 5.7–8.2)
SODIUM SERPL-SCNC: 139 MMOL/L (ref 136–145)
TRIGL SERPL-MCNC: 137 MG/DL (ref 30–149)
VLDLC SERPL CALC-MCNC: 22 MG/DL (ref 0–30)

## 2025-01-31 PROCEDURE — G0103 PSA SCREENING: HCPCS | Performed by: FAMILY MEDICINE

## 2025-01-31 PROCEDURE — 80053 COMPREHEN METABOLIC PANEL: CPT | Performed by: FAMILY MEDICINE

## 2025-01-31 PROCEDURE — 3008F BODY MASS INDEX DOCD: CPT | Performed by: FAMILY MEDICINE

## 2025-01-31 PROCEDURE — 82570 ASSAY OF URINE CREATININE: CPT | Performed by: FAMILY MEDICINE

## 2025-01-31 PROCEDURE — 99396 PREV VISIT EST AGE 40-64: CPT | Performed by: FAMILY MEDICINE

## 2025-01-31 PROCEDURE — 90471 IMMUNIZATION ADMIN: CPT | Performed by: FAMILY MEDICINE

## 2025-01-31 PROCEDURE — 3078F DIAST BP <80 MM HG: CPT | Performed by: FAMILY MEDICINE

## 2025-01-31 PROCEDURE — 90677 PCV20 VACCINE IM: CPT | Performed by: FAMILY MEDICINE

## 2025-01-31 PROCEDURE — 83036 HEMOGLOBIN GLYCOSYLATED A1C: CPT | Performed by: FAMILY MEDICINE

## 2025-01-31 PROCEDURE — 3074F SYST BP LT 130 MM HG: CPT | Performed by: FAMILY MEDICINE

## 2025-01-31 PROCEDURE — 80061 LIPID PANEL: CPT | Performed by: FAMILY MEDICINE

## 2025-01-31 PROCEDURE — 82043 UR ALBUMIN QUANTITATIVE: CPT | Performed by: FAMILY MEDICINE

## 2025-01-31 NOTE — PATIENT INSTRUCTIONS
1. Preventative health care  I reviewed age-appropriate preventive health screening exams as well as advanced directives and immunizations.  PCV-20 given  - Microalb/Creat Ratio, Random Urine [E]; Future  - Hemoglobin A1C [E]; Future  - Lipid Panel [E]; Future  - Comp Metabolic Panel (14) [E]; Future  - PSA, Total (Screening) [E]; Future    2. Type 2 diabetes mellitus without complication, without long-term current use of insulin (HCC)  I reviewed goals for fasting and postmeal blood sugars as well as A1c, patient strongly encouraged to monitor 2-hour postmeal blood sugars to give him feedback and information as to what foods are better choices  Discussed importance of annual retinal exams and glaucoma screening, routine foot exams and good fitting footwear, check A1c, medication adjustments pending results  - Microalb/Creat Ratio, Random Urine [E]; Future  - Hemoglobin A1C [E]; Future  - Comp Metabolic Panel (14) [E]; Future    3. Hypercholesterolemia  Reviewed goals for lipids.  Continue statin therapy, check labs annually  - Lipid Panel [E]; Future    4. Screening for prostate cancer  Continue annual surveillance  - PSA, Total (Screening) [E]; Future    5. BMI 26.0-26.9,adult  Maintain ideal body weight    6. Medication monitoring encounter  Recommend follow-up every 6 months    7. Benign prostatic hyperplasia with weak urinary stream  Reviewed signs and symptoms of lower urinary tract outlet obstruction as well as contributing factors.  I have asked patient to decrease his caffeine intake and increase his water intake.  Discussed medication options including daily tadalafil.  Patient declines need for medication at this time    8. Vasculogenic erectile dysfunction, unspecified vasculogenic erectile dysfunction type  See #7

## 2025-01-31 NOTE — H&P
Scott LIN Yonny Alfonso. is a 54 year old male   Chief Complaint   Patient presents with    Physical     Reviewed Preventative/Wellness form with patient.      Diabetes    Lipids     Patient had influenza A and 1/18/2025  HPI:   Pt occ checking bs ? weekly, using glucometer, FBS: 200, no post meal., compliant w/ meds per list, patient has been out of glimepiride the last 8 days, pharmacy states they did not get his refill despite documentation otherwise.  Activity: plans to start  Wt down 3 pounds since last visit    Wt Readings from Last 6 Encounters:   01/31/25 187 lb (84.8 kg)   05/29/24 190 lb 12.8 oz (86.5 kg)   08/25/23 183 lb (83 kg)   05/13/23 185 lb 11.2 oz (84.2 kg)   06/24/22 183 lb (83 kg)   01/18/22 182 lb (82.6 kg)     Body mass index is 26.77 kg/m².     Cholesterol, Total (mg/dL)   Date Value   02/03/2023 124   06/24/2022 121   01/18/2022 143     CHOLESTEROL, TOTAL (no units)   Date Value   04/20/2024 115     HDL Cholesterol (mg/dL)   Date Value   02/03/2023 34 (L)   06/24/2022 39 (L)   01/18/2022 39 (L)     HDL CHOL (no units)   Date Value   04/20/2024 37     LDL Cholesterol (mg/dL)   Date Value   04/20/2024 59   02/03/2023 69   06/24/2022 63   01/18/2022 78     AST (U/L)   Date Value   02/03/2023 21   01/18/2022 20   02/19/2021 12 (L)     ALT (U/L)   Date Value   02/03/2023 51   01/18/2022 48   05/29/2021 42      Current Outpatient Medications   Medication Sig Dispense Refill    glimepiride 1 MG Oral Tab Take 1 tablet (1 mg total) by mouth daily with breakfast. 30 tablet 0    metFORMIN  MG Oral Tablet 24 Hr Take 2 tablets (1,000 mg total) by mouth 2 (two) times daily with meals. 360 tablet 0    lisinopril 2.5 MG Oral Tab Take 1 tablet (2.5 mg total) by mouth daily. 90 tablet 0    atorvastatin 40 MG Oral Tab Take 1 tablet (40 mg total) by mouth nightly. 90 tablet 0    Blood Glucose Monitoring Suppl (BLOOD GLUCOSE MONITOR SYSTEM) w/Device Does not apply Kit As directed----to check blood glucose once  daily 1 kit 0    Glucose Blood (BLOOD GLUCOSE TEST) In Vitro Strip As directed----to check blood glucose once daily 100 strip 3    Lancets Does not apply Misc As directed---to check blood glucose once daily 100 each 3     Allergies[1]     Past Medical History:    Diabetes mellitus (HCC)    High cholesterol      Past Surgical History:   Procedure Laterality Date    Colonoscopy  2021    normal, repeat 10 yrs      Family History   Problem Relation Age of Onset    Other (parkinson's) Father     Diabetes Mother     No Known Problems Sister     No Known Problems Sister     No Known Problems Brother       Social History:  Social History     Socioeconomic History    Marital status:    Tobacco Use    Smoking status: Former     Current packs/day: 0.00     Types: Cigarettes     Quit date: 1996     Years since quittin.9     Passive exposure: Never    Smokeless tobacco: Never   Vaping Use    Vaping status: Never Used   Substance and Sexual Activity    Alcohol use: Yes     Alcohol/week: 3.0 standard drinks of alcohol     Types: 3 Cans of beer per week     Comment: a little bit but not much    Drug use: Yes     Types: Cannabis     Comment: once a week   Other Topics Concern    Caffeine Concern No    Exercise No    Seat Belt Yes    Special Diet No    Stress Concern No    Weight Concern No      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   Exercise: minimal.  Diet: trying to eat healthy, caffeine 24 oz     REVIEW OF SYSTEMS:   GENERAL: generally feels well, no fatigue, denies excessive daytime drowsiness, good appetite  SKIN: denies any unusual skin lesions or rashes  EYES:denies blurred vision or double vision, glasses/ contacts: + progressive lens, last eye exam:23 @ [facility]  ENT: denies nasal congestion, PND or ST, denies snoring and reported periods of apnea, denies hearing deficits  LUNGS: denies shortness of breath with exertion, no coughing or wheezing  CARDIOVASCULAR: denies  chest pain on exertion, palpations, anginal equivalent symptoms, no claudication , no peripheral edema  GI: denies abdominal pain,denies heartburn, constipation, diarrhea, or change in bowel habits  : denies dysuria, hesitancy,nocturia, slight decreased urine stream,denies incontinence, some erectile dysfunction- not a problem at this time,denies decreased libido   MUSCULOSKELETAL: denies back or joint pain  NEURO: denies headaches, tremors, dizziness, numbness and weakness  PSYCHE: denies depression or anxiety  HEMATOLOGIC: denies unexplained bruising or bleeding  ENDOCRINE: denies heat or cold intolerance , no unexplained wt loss or gain  EXAM:   /74 (BP Location: Left arm, Patient Position: Sitting, Cuff Size: adult)   Pulse 90   Temp 97.7 °F (36.5 °C) (Temporal)   Resp 16   Ht 5' 10.08\" (1.78 m)   Wt 187 lb (84.8 kg)   SpO2 98%   BMI 26.77 kg/m²   Body mass index is 26.77 kg/m².   GENERAL: well developed, well nourished,in no apparent distress  SKIN: no rashes,no suspicious lesions  ENT: EAC:  Clear, TMs: intact, Nose: turbinates normal, septum: midline, discharge: none, Pharynx: class 2 airway, no oral lesions  EYES:PERRLA, EOMI, normal optic disk,conjunctiva are clear  NECK: supple,no adenopathy,no bruits, no thyromegaly  LUNGS: clear to auscultation, no w/r/r  CARDIO: RRR without murmur, no S3, S4, strong peripheral pulses, no peripheral edema  GI: +NBS's,no masses, HSM or tenderness  : two descended testes,no masses,no hernia,no penile lesions  RECTAL: deferred  BACK:  : FROM, No lateral curves, Flexion:  Fingers to floor   EXTREMITIES: no cyanosis, clubbing or edema, FROM of all joints tested  BILATERAL FOOT EXAM OF PLANTAR ASPECT: WITHOUT CALLUS, ULCERS OR GROSS DEFORMITIES, SENSATION INTACT TO MONOFILAMENT TESTING, GOOD DP / PT PULSES, NAILS W/O DYSTROPHIC CHANGES  NEURO: A &O X 3,cranial nerves are intact,motor and sensory are grossly intact, DTRs +2/4 UE/LE bilaterally  PSYCH: affect:  bright, slightly anxious, speech: clear and coherent, Insight: appropriate  ASSESSMENT AND PLAN:   Scott Blancas Jr. is a 54 year old male   Encounter Diagnoses   Name Primary?    Preventative health care Yes    Type 2 diabetes mellitus without complication, without long-term current use of insulin (HCC)     Hypercholesterolemia     Screening for prostate cancer     BMI 26.0-26.9,adult     Medication monitoring encounter     Benign prostatic hyperplasia with weak urinary stream     Vasculogenic erectile dysfunction, unspecified vasculogenic erectile dysfunction type      Orders Placed This Encounter   Procedures    Microalb/Creat Ratio, Random Urine [E]    Hemoglobin A1C [E]    Lipid Panel [E]    Comp Metabolic Panel (14) [E]    PSA, Total (Screening) [E]    Prevnar 20 (PCV20) [87228]     Meds & Refills for this Visit:  Requested Prescriptions      No prescriptions requested or ordered in this encounter     Imaging & Consults:  PCV20 VACCINE FOR INTRAMUSCULAR USE  No follow-ups on file.  Patient Instructions   1. Preventative health care  I reviewed age-appropriate preventive health screening exams as well as advanced directives and immunizations.  PCV-20 given  - Microalb/Creat Ratio, Random Urine [E]; Future  - Hemoglobin A1C [E]; Future  - Lipid Panel [E]; Future  - Comp Metabolic Panel (14) [E]; Future  - PSA, Total (Screening) [E]; Future    2. Type 2 diabetes mellitus without complication, without long-term current use of insulin (HCC)  I reviewed goals for fasting and postmeal blood sugars as well as A1c, patient strongly encouraged to monitor 2-hour postmeal blood sugars to give him feedback and information as to what foods are better choices  Discussed importance of annual retinal exams and glaucoma screening, routine foot exams and good fitting footwear, check A1c, medication adjustments pending results  - Microalb/Creat Ratio, Random Urine [E]; Future  - Hemoglobin A1C [E]; Future  - Comp Metabolic Panel  (14) [E]; Future    3. Hypercholesterolemia  Reviewed goals for lipids.  Continue statin therapy, check labs annually  - Lipid Panel [E]; Future    4. Screening for prostate cancer  Continue annual surveillance  - PSA, Total (Screening) [E]; Future    5. BMI 26.0-26.9,adult  Maintain ideal body weight    6. Medication monitoring encounter  Recommend follow-up every 6 months    7. Benign prostatic hyperplasia with weak urinary stream  Reviewed signs and symptoms of lower urinary tract outlet obstruction as well as contributing factors.  I have asked patient to decrease his caffeine intake and increase his water intake.  Discussed medication options including daily tadalafil.  Patient declines need for medication at this time    8. Vasculogenic erectile dysfunction, unspecified vasculogenic erectile dysfunction type  See #7    Billy Monteiro DO, FAAFP           [1]   Allergies  Allergen Reactions    Penicillins UNKNOWN     Known since childhood

## 2025-02-01 DIAGNOSIS — Z00.00 PREVENTATIVE HEALTH CARE: Primary | ICD-10-CM

## 2025-02-01 DIAGNOSIS — Z12.5 SCREENING FOR PROSTATE CANCER: ICD-10-CM

## 2025-02-01 DIAGNOSIS — R39.12 BENIGN PROSTATIC HYPERPLASIA WITH WEAK URINARY STREAM: ICD-10-CM

## 2025-02-01 DIAGNOSIS — E78.00 HYPERCHOLESTEROLEMIA: ICD-10-CM

## 2025-02-01 DIAGNOSIS — E11.9 TYPE 2 DIABETES MELLITUS WITHOUT COMPLICATION, WITHOUT LONG-TERM CURRENT USE OF INSULIN (HCC): ICD-10-CM

## 2025-02-01 DIAGNOSIS — E11.9 TYPE 2 DIABETES MELLITUS WITHOUT COMPLICATION, WITHOUT LONG-TERM CURRENT USE OF INSULIN (HCC): Primary | ICD-10-CM

## 2025-02-01 DIAGNOSIS — R74.8 ELEVATED LIVER ENZYMES: ICD-10-CM

## 2025-02-01 DIAGNOSIS — N40.1 BENIGN PROSTATIC HYPERPLASIA WITH WEAK URINARY STREAM: ICD-10-CM

## 2025-02-01 DIAGNOSIS — E78.5 ELEVATED LIPIDS: ICD-10-CM

## 2025-02-01 RX ORDER — GLIMEPIRIDE 2 MG/1
2 TABLET ORAL
Qty: 30 TABLET | Refills: 0 | Status: SHIPPED | OUTPATIENT
Start: 2025-02-01

## 2025-02-01 RX ORDER — SITAGLIPTIN 100 MG/1
100 TABLET ORAL DAILY
Qty: 30 TABLET | Refills: 0 | Status: SHIPPED | OUTPATIENT
Start: 2025-02-01

## 2025-02-24 DIAGNOSIS — E11.9 TYPE 2 DIABETES MELLITUS WITHOUT COMPLICATION, WITHOUT LONG-TERM CURRENT USE OF INSULIN (HCC): ICD-10-CM

## 2025-02-24 RX ORDER — GLIMEPIRIDE 2 MG/1
2 TABLET ORAL
Qty: 90 TABLET | Refills: 0 | Status: SHIPPED | OUTPATIENT
Start: 2025-02-24

## 2025-02-24 RX ORDER — LISINOPRIL 2.5 MG/1
2.5 TABLET ORAL DAILY
Qty: 90 TABLET | Refills: 0 | Status: SHIPPED | OUTPATIENT
Start: 2025-02-24

## 2025-02-24 RX ORDER — ATORVASTATIN CALCIUM 40 MG/1
40 TABLET, FILM COATED ORAL NIGHTLY
Qty: 90 TABLET | Refills: 0 | Status: SHIPPED | OUTPATIENT
Start: 2025-02-24

## 2025-02-24 NOTE — TELEPHONE ENCOUNTER
Lisinopril  Last refill 05/15/24  Qty 90  W/ 0 refills  Last ov; 01/31/25    Atorvastatin   Last refill: 05/15/24  Qty 90  W 0 refills  Last ov: 01/31/25  Requested Prescriptions     Pending Prescriptions Disp Refills    LISINOPRIL 2.5 MG Oral Tab [Pharmacy Med Name: LISINOPRIL 2.5 MG TABLET] 90 tablet 0     Sig: TAKE 1 TABLET BY MOUTH EVERY DAY    ATORVASTATIN 40 MG Oral Tab [Pharmacy Med Name: ATORVASTATIN 40 MG TABLET] 90 tablet 0     Sig: TAKE 1 TABLET BY MOUTH EVERY DAY AT NIGHT     Future Appointments   Date Time Provider Department Center   5/3/2025  8:15 AM [facility] N[facility] [place]

## 2025-02-24 NOTE — TELEPHONE ENCOUNTER
Last office visit: 1/31/25  Future Appointments   Date Time Provider Department Center   5/3/2025  8:15 AM [facility] [place]   Last filled: 2/1/25 Qty 30 R 0  Last labs: 1/31/25 (Repeat labs in May 25)

## 2025-03-06 RX ORDER — METFORMIN HYDROCHLORIDE 500 MG/1
1000 TABLET, EXTENDED RELEASE ORAL 2 TIMES DAILY WITH MEALS
Qty: 360 TABLET | Refills: 1 | Status: SHIPPED | OUTPATIENT
Start: 2025-03-06

## 2025-03-06 NOTE — TELEPHONE ENCOUNTER
Last office visit: 1/31/25  Last refill: 7/11/24  Labs Due: 5/1/25  Future Appointments   Date Time Provider Department Center   5/3/2025  8:15 AM [facility] [place]

## 2025-03-13 DIAGNOSIS — E11.9 TYPE 2 DIABETES MELLITUS WITHOUT COMPLICATION, WITHOUT LONG-TERM CURRENT USE OF INSULIN (HCC): ICD-10-CM

## 2025-03-14 RX ORDER — SITAGLIPTIN 100 MG/1
1 TABLET ORAL DAILY
Qty: 30 TABLET | Refills: 1 | Status: SHIPPED | OUTPATIENT
Start: 2025-03-14

## 2025-03-14 NOTE — TELEPHONE ENCOUNTER
OV 01/31/25  LABS 01/31/25 A1C 9.3    REFILL 02/01/25 #30    Future Appointments   Date Time Provider Department Center   5/3/2025  8:15 AM [facility] [place]         Diabetes Medication Protocol [id number]/13/2025 06:03 PM   Protocol Details Last A1C < 7.5 and within past 6 months    Medication is active on med list    In person appointment or virtual visit in the past 6 mos or appointment in next 3 mos    Microalbumin procedure in past 12 months or taking ACE/ARB    EGFRCR or GFRNAA > 50    GFR in the past 12 months

## 2025-04-28 DIAGNOSIS — E11.9 TYPE 2 DIABETES MELLITUS WITHOUT COMPLICATION, WITHOUT LONG-TERM CURRENT USE OF INSULIN (HCC): ICD-10-CM

## 2025-04-28 RX ORDER — SITAGLIPTIN 100 MG/1
1 TABLET ORAL DAILY
Qty: 30 TABLET | Refills: 1 | Status: SHIPPED | OUTPATIENT
Start: 2025-04-28

## 2025-04-28 NOTE — TELEPHONE ENCOUNTER
LOV:1-    LAST LAB:1- Hga1c 9.3    LAST RX:  SITagliptin (ZITUVIO) 100 MG Oral Tab 30 tablet 1 3/14/2025 --   Sig:   TAKE 1 TABLET BY MOUTH EVERY DAY         Next OV:   Future Appointments   Date Time Provider Department Center   5/3/2025  8:15 AM [facility] [place]          PROTOCOL    Diabetes Medication Protocol [id number]/28/2025 03:57 PM   Protocol Details Last A1C < 7.5 and within past 6 months    In person appointment or virtual visit in the past 6 mos or appointment in next 3 mos    Microalbumin procedure in past 12 months or taking ACE/ARB    EGFRCR or GFRNAA > 50    GFR in the past 12 months    Medication is active on med list

## 2025-05-24 RX ORDER — ATORVASTATIN CALCIUM 40 MG/1
40 TABLET, FILM COATED ORAL NIGHTLY
Qty: 30 TABLET | Refills: 0 | Status: SHIPPED | OUTPATIENT
Start: 2025-05-24

## 2025-05-24 RX ORDER — LISINOPRIL 2.5 MG/1
2.5 TABLET ORAL DAILY
Qty: 90 TABLET | Refills: 0 | Status: SHIPPED | OUTPATIENT
Start: 2025-05-24

## 2025-05-24 NOTE — TELEPHONE ENCOUNTER
Cholesterol Medication Protocol [id number]/24/2025 07:01 AM   Protocol Details ALT < 80    ALT resulted within past year    Lipid panel within past 12 months    In person appointment or virtual visit in the past 12 mos or appointment in next 3 mos    Medication is active on med list          Hypertension Medications Protocol Qpitjy4805/24/2025 07:01 AM   Protocol Details CMP or BMP in past 12 months    Last BP reading less than 140/90    In person appointment or virtual visit in the past 12 mos or appointment in next 3 mos    EGFRCR or GFRNAA > 50    Medication is active on med list          Last office visit:  01/31/25  Last lipid:  01/31/25 -- due for recheck ast/alt  Last cmp:  01/31/25  BP Readings from Last 3 Encounters:   01/31/25 114/74   01/18/25 (!) 147/91   05/29/24 116/78     No future appointments.  Atorvastatin:  02/24/25  #90, no refills  Lisinopril:  02/24/25  #90, no refills    Sending mychart to come back in for repeat fasting labs.

## 2025-05-28 DIAGNOSIS — E11.9 TYPE 2 DIABETES MELLITUS WITHOUT COMPLICATION, WITHOUT LONG-TERM CURRENT USE OF INSULIN (HCC): ICD-10-CM

## 2025-05-28 RX ORDER — GLIMEPIRIDE 2 MG/1
2 TABLET ORAL
Qty: 30 TABLET | Refills: 0 | Status: SHIPPED | OUTPATIENT
Start: 2025-05-28

## 2025-05-28 NOTE — TELEPHONE ENCOUNTER
OV 01/31/25  LABS 01/31/25 A1C 9.3    REFILL 02/24/25 #90    No future appointments. A1C was due 04/30/25    Mychart reminder sent to patient, 30 day supply sent to pharmacy

## 2025-06-24 RX ORDER — ATORVASTATIN CALCIUM 40 MG/1
40 TABLET, FILM COATED ORAL NIGHTLY
Qty: 14 TABLET | Refills: 0 | Status: SHIPPED | OUTPATIENT
Start: 2025-06-24

## 2025-06-24 NOTE — TELEPHONE ENCOUNTER
Faxed request for refill on atorvastatin 40 mg    LOV  1-31-25    LAST LAB  1-31-25 Chem Profile /gfr 93                      1-31-25 Lipid Panel    LAST RX  5-24-25  #30    Next OV  No future appointments.

## 2025-07-13 DIAGNOSIS — E11.9 TYPE 2 DIABETES MELLITUS WITHOUT COMPLICATION, WITHOUT LONG-TERM CURRENT USE OF INSULIN (HCC): ICD-10-CM

## 2025-07-14 NOTE — TELEPHONE ENCOUNTER
OV 01/31/25  LABS 01/31/25 A1c 9.3    REFILL 05/28/25 #30    No future appointments. A1c, liver enzymes were due 04/30/25. Follow up with TAMI Monteiro due now. LVM 06/24/25.     Left  07/14/25 to call back

## 2025-07-21 ENCOUNTER — TELEPHONE (OUTPATIENT)
Dept: FAMILY MEDICINE CLINIC | Facility: CLINIC | Age: 55
End: 2025-07-21

## 2025-07-21 RX ORDER — SITAGLIPTIN 100 MG/1
1 TABLET ORAL DAILY
Qty: 30 TABLET | Refills: 0 | Status: SHIPPED | OUTPATIENT
Start: 2025-07-21

## 2025-07-21 RX ORDER — GLIMEPIRIDE 2 MG/1
2 TABLET ORAL
Qty: 30 TABLET | Refills: 0 | Status: SHIPPED | OUTPATIENT
Start: 2025-07-21

## 2025-07-21 NOTE — TELEPHONE ENCOUNTER
SITagliptin (ZITUVIO) 100 MG Oral Tab  [facility]    Patient returned call & scheduled office visit for earlier refill request on glimepiride.  He said he is out of this medication.

## 2025-07-22 DIAGNOSIS — E11.9 TYPE 2 DIABETES MELLITUS WITHOUT COMPLICATION, WITHOUT LONG-TERM CURRENT USE OF INSULIN (HCC): ICD-10-CM

## 2025-07-22 RX ORDER — GLIMEPIRIDE 2 MG/1
2 TABLET ORAL
Qty: 90 TABLET | Refills: 0 | Status: SHIPPED | OUTPATIENT
Start: 2025-07-22

## 2025-07-22 NOTE — TELEPHONE ENCOUNTER
Fax received from [facility] requesting a 90 day supply of glimepiride so that med is covered by insurance.    Pt was given a 30 day supply as he was overdue for appt. He is now scheduled for an appt 7/30/25

## 2025-07-23 RX ORDER — ATORVASTATIN CALCIUM 40 MG/1
40 TABLET, FILM COATED ORAL NIGHTLY
Qty: 90 TABLET | Refills: 0 | Status: SHIPPED | OUTPATIENT
Start: 2025-07-23

## 2025-07-23 NOTE — TELEPHONE ENCOUNTER
Last refill: 6/24/25  Last ov: 1/31/25  Future Appointments   Date Time Provider Department Center   7/30/2025  2:30 PM Billy Monteiro, DO [facility] [place]   7/30/2025  3:00 PM REF [facility]     Cholesterol Medication Protocol [id number]/23/2025 10:23 AM   Protocol Details ALT < 80    ALT resulted within past year    Lipid panel within past 12 months    In person appointment or virtual visit in the past 12 mos or appointment in next 3 mos    Medication is active on med list

## 2025-07-30 ENCOUNTER — LABORATORY ENCOUNTER (OUTPATIENT)
Dept: LAB | Age: 55
End: 2025-07-30
Attending: FAMILY MEDICINE
Payer: COMMERCIAL

## 2025-07-30 ENCOUNTER — OFFICE VISIT (OUTPATIENT)
Dept: FAMILY MEDICINE CLINIC | Facility: CLINIC | Age: 55
End: 2025-07-30
Payer: COMMERCIAL

## 2025-07-30 VITALS
OXYGEN SATURATION: 98 % | HEART RATE: 85 BPM | SYSTOLIC BLOOD PRESSURE: 110 MMHG | RESPIRATION RATE: 18 BRPM | BODY MASS INDEX: 27.77 KG/M2 | HEIGHT: 70.08 IN | TEMPERATURE: 97 F | DIASTOLIC BLOOD PRESSURE: 60 MMHG | WEIGHT: 194 LBS

## 2025-07-30 DIAGNOSIS — R74.8 ELEVATED LIVER ENZYMES: ICD-10-CM

## 2025-07-30 DIAGNOSIS — E78.5 ELEVATED LIPIDS: ICD-10-CM

## 2025-07-30 DIAGNOSIS — E11.9 TYPE 2 DIABETES MELLITUS WITHOUT COMPLICATION, WITHOUT LONG-TERM CURRENT USE OF INSULIN (HCC): Primary | ICD-10-CM

## 2025-07-30 DIAGNOSIS — E11.9 TYPE 2 DIABETES MELLITUS WITHOUT COMPLICATION, WITHOUT LONG-TERM CURRENT USE OF INSULIN (HCC): ICD-10-CM

## 2025-07-30 DIAGNOSIS — E78.00 HYPERCHOLESTEROLEMIA: ICD-10-CM

## 2025-07-30 LAB
ALT SERPL-CCNC: 49 U/L (ref 10–49)
AST SERPL-CCNC: 26 U/L (ref ?–34)
EST. AVERAGE GLUCOSE BLD GHB EST-MCNC: 180 MG/DL (ref 68–126)
HBA1C MFR BLD: 7.9 % (ref ?–5.7)

## 2025-07-30 PROCEDURE — 83036 HEMOGLOBIN GLYCOSYLATED A1C: CPT | Performed by: FAMILY MEDICINE

## 2025-07-30 PROCEDURE — 3074F SYST BP LT 130 MM HG: CPT | Performed by: FAMILY MEDICINE

## 2025-07-30 PROCEDURE — 3078F DIAST BP <80 MM HG: CPT | Performed by: FAMILY MEDICINE

## 2025-07-30 PROCEDURE — 3008F BODY MASS INDEX DOCD: CPT | Performed by: FAMILY MEDICINE

## 2025-07-30 PROCEDURE — 99214 OFFICE O/P EST MOD 30 MIN: CPT | Performed by: FAMILY MEDICINE

## 2025-07-30 PROCEDURE — 3046F HEMOGLOBIN A1C LEVEL >9.0%: CPT | Performed by: FAMILY MEDICINE

## 2025-07-30 PROCEDURE — 84450 TRANSFERASE (AST) (SGOT): CPT | Performed by: FAMILY MEDICINE

## 2025-07-30 PROCEDURE — 3061F NEG MICROALBUMINURIA REV: CPT | Performed by: FAMILY MEDICINE

## 2025-07-30 PROCEDURE — 84460 ALANINE AMINO (ALT) (SGPT): CPT | Performed by: FAMILY MEDICINE

## 2025-08-28 RX ORDER — LISINOPRIL 2.5 MG/1
2.5 TABLET ORAL DAILY
Qty: 90 TABLET | Refills: 0 | Status: SHIPPED | OUTPATIENT
Start: 2025-08-28

## (undated) NOTE — LETTER
05/24/21        Sarah Graham   [address] [facility]      Dear Kinza Rashid,    Our records indicate that you have outstanding lab work and or testing that was ordered for you and has not yet been completed:  Orders Placed This Encounter